# Patient Record
Sex: FEMALE | Race: WHITE | HISPANIC OR LATINO | URBAN - METROPOLITAN AREA
[De-identification: names, ages, dates, MRNs, and addresses within clinical notes are randomized per-mention and may not be internally consistent; named-entity substitution may affect disease eponyms.]

---

## 2023-03-03 ENCOUNTER — OFFICE VISIT (OUTPATIENT)
Dept: URGENT CARE | Facility: CLINIC | Age: 33
End: 2023-03-03

## 2023-03-03 VITALS
DIASTOLIC BLOOD PRESSURE: 62 MMHG | TEMPERATURE: 98.8 F | HEART RATE: 77 BPM | SYSTOLIC BLOOD PRESSURE: 122 MMHG | HEIGHT: 66 IN | OXYGEN SATURATION: 99 % | BODY MASS INDEX: 28.93 KG/M2 | RESPIRATION RATE: 18 BRPM | WEIGHT: 180 LBS

## 2023-03-03 DIAGNOSIS — R05.1 ACUTE COUGH: Primary | ICD-10-CM

## 2023-03-03 DIAGNOSIS — J06.9 UPPER RESPIRATORY TRACT INFECTION, UNSPECIFIED TYPE: ICD-10-CM

## 2023-03-03 LAB
SARS-COV-2 AG UPPER RESP QL IA: NEGATIVE
VALID CONTROL: NORMAL

## 2023-03-03 RX ORDER — PNV 119/IRON FUM/FOLIC ACID 29 MG-1 MG
1 TABLET ORAL DAILY
COMMUNITY

## 2023-03-04 NOTE — PROGRESS NOTES
[facility]        NAME: Regan Matson is a 27 y o  female  : 1990    MRN: [id number]  DATE: March 3, 2023  TIME: 7:14 PM    Assessment and Plan   Upper respiratory tract infection, unspecified type [J06 9]  1  Upper respiratory tract infection, unspecified type        2  Acute cough  Poct Covid 19 Rapid Antigen Test        POC Covid test negative, will send COVID/flu PCR and follow-up with results  Patient Instructions      COVID/flu testing done in office, will follow-up with results  Continue over-the-counter products for symptoms: tylenol for fevers, ibuprofen for body aches, flonase (fluticasone) for nasal congestion and airborne/emergen-c for vitamin supplementation  Follow-up with PCP in 3-5 days  Report to ER if symptoms worsen  Chief Complaint     Chief Complaint   Patient presents with   • Cold Like Symptoms     Pt experiencing fatigue, shortness of breath, congestion, sore throat and cough x5 days  History of Present Illness       27year old female presents with cough, congestion, and fever for the past 5 days  Patient reports possible exposure to sister who was on a cruise last week  Patient is 13 weeks pregnant and has been taking OTC products recommended by her OBGYN physician, with minimal improvement of symptoms  URI   This is a new problem  The current episode started in the past 7 days  The problem has been unchanged  The maximum temperature recorded prior to her arrival was 101 - 101 9 F  The fever has been present for less than 1 day  Associated symptoms include congestion, coughing, headaches, nausea, rhinorrhea and a sore throat  Pertinent negatives include no abdominal pain, chest pain, diarrhea, dysuria, ear pain, joint pain, joint swelling, neck pain, plugged ear sensation, rash, sinus pain, sneezing, swollen glands, vomiting or wheezing  She has tried acetaminophen and decongestant for the symptoms  The treatment provided mild relief  Review of Systems   Review of Systems   Constitutional: Positive for activity change, fatigue and fever  Negative for appetite change and chills  HENT: Positive for congestion, postnasal drip, rhinorrhea and sore throat  Negative for ear pain, sinus pressure, sinus pain, sneezing and trouble swallowing  Respiratory: Positive for cough  Negative for wheezing  Cardiovascular: Negative for chest pain and palpitations  Gastrointestinal: Positive for nausea  Negative for abdominal pain, diarrhea and vomiting  Genitourinary: Negative for dysuria  Musculoskeletal: Negative for arthralgias, joint pain, myalgias and neck pain  Skin: Negative for color change and rash  Neurological: Positive for headaches  Current Medications       Current Outpatient Medications:   •  Prenatal Vit-DSS-Fe Fum-FA (Prenatal 19) tablet, Take 1 tablet by mouth daily, Disp: , Rfl:     Current Allergies     Allergies as of 03/03/2023   • (No Known Allergies)            The following portions of the patient's history were reviewed and updated as appropriate: allergies, current medications, past family history, past medical history, past social history, past surgical history and problem list      History reviewed  No pertinent past medical history  History reviewed  No pertinent surgical history  History reviewed  No pertinent family history  Medications have been verified  Objective   /62 (BP Location: Left arm)   Pulse 77   Temp 98 8 °F (37 1 °C) (Temporal)   Resp 18   Ht 5' 6" (1 676 m)   Wt 81 6 kg (180 lb)   LMP 12/16/2022   SpO2 99%   BMI 29 05 kg/m²        Physical Exam     Physical Exam  Vitals and nursing note reviewed  Constitutional:       General: She is awake  Appearance: Normal appearance  She is well-developed and normal weight  HENT:      Head: Normocephalic and atraumatic        Right Ear: Hearing, tympanic membrane, ear canal and external ear normal       Left Ear: Hearing, tympanic membrane, ear canal and external ear normal       Nose: Congestion and rhinorrhea present  Rhinorrhea is clear  Right Turbinates: Enlarged  Not pale  Left Turbinates: Enlarged  Not swollen or pale  Right Sinus: No maxillary sinus tenderness or frontal sinus tenderness  Left Sinus: No maxillary sinus tenderness or frontal sinus tenderness  Mouth/Throat:      Mouth: Mucous membranes are moist       Pharynx: Oropharynx is clear  Uvula midline  Posterior oropharyngeal erythema present  No oropharyngeal exudate  Cardiovascular:      Rate and Rhythm: Normal rate and regular rhythm  Pulses: Normal pulses  Heart sounds: Normal heart sounds  Pulmonary:      Effort: Pulmonary effort is normal       Breath sounds: Normal breath sounds  Musculoskeletal:      Cervical back: Normal range of motion and neck supple  Lymphadenopathy:      Cervical: Cervical adenopathy present  Skin:     General: Skin is warm and dry  Neurological:      Mental Status: She is alert and oriented to person, place, and time  Psychiatric:         Mood and Affect: Mood normal          Behavior: Behavior normal  Behavior is cooperative  Thought Content:  Thought content normal          Judgment: Judgment normal

## 2023-03-04 NOTE — PATIENT INSTRUCTIONS
COVID/flu testing done in office, will follow-up with results  Continue over-the-counter products for symptoms: tylenol for fevers, ibuprofen for body aches, flonase (fluticasone) for nasal congestion and airborne/emergen-c for vitamin supplementation  Follow-up with PCP in 3-5 days  Report to ER if symptoms worsen

## 2023-03-05 ENCOUNTER — TELEPHONE (OUTPATIENT)
Dept: URGENT CARE | Facility: MEDICAL CENTER | Age: 33
End: 2023-03-05

## 2023-03-05 LAB
FLUAV RNA RESP QL NAA+PROBE: NEGATIVE
FLUBV RNA RESP QL NAA+PROBE: POSITIVE
SARS-COV-2 RNA RESP QL NAA+PROBE: NEGATIVE

## 2023-03-06 ENCOUNTER — TELEPHONE (OUTPATIENT)
Dept: OBGYN CLINIC | Facility: CLINIC | Age: 33
End: 2023-03-06

## 2023-03-06 ENCOUNTER — TELEPHONE (OUTPATIENT)
Dept: URGENT CARE | Facility: CLINIC | Age: 33
End: 2023-03-06

## 2023-03-06 NOTE — TELEPHONE ENCOUNTER
Patient and  called back, and COVID/flu results were discussed with them  Recommend they follow up with OB

## 2023-03-06 NOTE — TELEPHONE ENCOUNTER
Left voice message to patient- never been seen cannot give medical advice  Reach out to PCP- old OBGYN- or if urgent goto ER

## 2023-03-06 NOTE — TELEPHONE ENCOUNTER
Pt would be a NEW pt (12 wks) being seen here 3/23 for establishing Care/ pt did have an [address] at [facility] which is being faxed over,   pt seen Friday 3/3 at [facility] for flu symptoms which Results state pt has FLU she  has had a rapid heart beat on & off since last week and  Drew Pepper would like a call to discuss,  pls call at [phone],  thanks

## 2023-03-23 ENCOUNTER — OFFICE VISIT (OUTPATIENT)
Dept: OBGYN CLINIC | Facility: MEDICAL CENTER | Age: 33
End: 2023-03-23

## 2023-03-23 VITALS
BODY MASS INDEX: 38.25 KG/M2 | HEIGHT: 61 IN | DIASTOLIC BLOOD PRESSURE: 70 MMHG | WEIGHT: 202.6 LBS | SYSTOLIC BLOOD PRESSURE: 118 MMHG | HEART RATE: 66 BPM

## 2023-03-23 DIAGNOSIS — Z3A.13 13 WEEKS GESTATION OF PREGNANCY: Primary | ICD-10-CM

## 2023-03-23 DIAGNOSIS — O99.211 OBESITY AFFECTING PREGNANCY IN FIRST TRIMESTER: ICD-10-CM

## 2023-03-23 DIAGNOSIS — O26.891 HEARTBURN DURING PREGNANCY IN FIRST TRIMESTER: ICD-10-CM

## 2023-03-23 DIAGNOSIS — R12 HEARTBURN DURING PREGNANCY IN FIRST TRIMESTER: ICD-10-CM

## 2023-03-23 DIAGNOSIS — R35.0 URINARY FREQUENCY: ICD-10-CM

## 2023-03-23 PROBLEM — O99.210 OBESITY AFFECTING PREGNANCY: Status: ACTIVE | Noted: 2023-03-23

## 2023-03-23 LAB
BACTERIA UR QL AUTO: ABNORMAL /HPF
BILIRUB UR QL STRIP: NEGATIVE
CLARITY UR: CLEAR
COLOR UR: COLORLESS
GLUCOSE UR STRIP-MCNC: ABNORMAL MG/DL
HGB UR QL STRIP.AUTO: NEGATIVE
KETONES UR STRIP-MCNC: NEGATIVE MG/DL
LEUKOCYTE ESTERASE UR QL STRIP: NEGATIVE
NITRITE UR QL STRIP: NEGATIVE
NON-SQ EPI CELLS URNS QL MICRO: ABNORMAL /HPF
PH UR STRIP.AUTO: 6 [PH]
PROT UR STRIP-MCNC: NEGATIVE MG/DL
RBC #/AREA URNS AUTO: ABNORMAL /HPF
SP GR UR STRIP.AUTO: 1.01 (ref 1–1.03)
UROBILINOGEN UR STRIP-ACNC: <2 MG/DL
WBC #/AREA URNS AUTO: ABNORMAL /HPF

## 2023-03-23 NOTE — PATIENT INSTRUCTIONS
Again, congratulations on your pregnancy! NEXT STEPS  Get Prenatal bloodwork  Call MFM to schedule next ultrasound (done 12-13 wks)   Contact information for [facility] (AKA Maternal Fetal Medicine)- Main Number [phone]   Return to our office in 1-2 weeks for an OB intake and initial prenatal Exam(or sooner if any problems/concerns arise- see packet for things to report)  Check out Melony [facility]illo[employer] website to read the "Pregnancy Essentials Guide" -this has lots of great early pregnancy information     It can be found by going to [employer]  org-->select services-->select women's health-->select Obstetrics  [email or web address]    Below is a variety of information that is useful in early pregnancy   Genetic screening can be very confusing for most people   We do recommend genetic screening universally for all pregnant women  Our goal is to have the most healthy uncomplicated pregnancy possible and this is one way to identify possible complications early  Common disorders we can screen for include: Down Syndrome, Neural tube defects ( like spina bifida or gastroschisis) and trisomy 15, even possibly more  There are several options we will talk more about  Below is some information to get you started thinking about this  We will discuss this more at the next appt  GUIDE TO GENETIC TESTING    Aneuploidy Testing  Trisomy 21 (Down Syndrome), Trisomy 18, and Open Neural Tube Defects (Spina Bifida)  You may choose one of the following options: See below for CPT/Diagnostic codes  NIPT (Non-Invasive Prenatal Testing or Cell Free- Fetal DNA): This simple and accurate non-invasive prenatal screening blood test offers results for early risk assessment of Down syndrome (Trisomy 21), or Trisomy 25, trisomy 15 and other aneuploidy conditions  The test also offers an optional analysis for fetal sex    The test analyzes the relative amount of 21, 18, 13; X and Y chromosome material in circulating cell-free DNA from a maternal blood sample  This test can be performed at any time after 10 weeks gestation  If you elect this test, you will also have an AFP (alpha-fetoprotein) blood test to test for open neural tube defects  Recommended follow up to a positive result is genetic counseling and prenatal diagnosis  Sequential Screening with Nuchal Translucency: This is a two-step test to detect whether a fetus is at increased risk for trisomy 24, trisomy 25, trisomy 15 and open neural tube defects  The test has a narrow window for testing (the first step must be performed between 10 and 13 weeks gestation)  It includes two blood draws and an ultrasound  The ultrasound measures the amount of fluid behind the baby’s neck called the nuchal translucency (NT)  The blood tests measures three different maternal hormone levels, -- pregnancy associated plasma protein (JONO-A), human chorionic gonadotrophin (hCG), and dimeric inhibin A (ROSEMARY)  These measurements in combination with some maternal information such as height and weight are used to calculate whether the baby is at increased risk for Down Syndrome or Trisomy 18  An alpha-fetoprotein test (AFP) is also included to test for open neural tube defects  Recommended follow up to a positive result is additional testing that is more definitive, and referral for genetic counseling and prenatal diagnosis  Quad Screen: This test is also known as the quadruple marker test   It is a test that measures levels of four substances in a pregnant woman’s blood--Alpha-fetoprotein (AFP), a protein made by the developing baby; Human chorionic gonadotropin (hCG), a hormone made by the placenta; Estriol, a hormone made by the placenta and the baby’s liver; and Inhibin A, another hormone made by the placenta    Typically, the quad screen is done between weeks 15 and 20 of pregnancy--the second trimester and the results indicate whether the baby is at higher risk for Down Syndrome (Trisomy 21), Trisomy 18, and open neural tube defects  This is a screening test   Recommended follow up to a positive result is additional testing that is more definitive, as well as referral for genetic counseling and prenatal diagnosis  Trisomy 21 Trisomy 18 Trisomy 13   NIPT  (FPR 0 1%) <99% <98% 99%   Sequential Screening (FPR 3 5%) 92% 90% N/A   Quad Screen   (FPR 5%) 83% 80% N/A   (FPR is False Positive Rate)       Additional Screening Tests Offered  Cystic Fibrosis: Cystic Fibrosis is the most common inherited disease of children and young adults  The carrier frequency is 1 in 24, to 1 in 97  Both parents need to be carriers for a child to be affected (25% chance)  One in 200, children born are affected  Cystic fibrosis is a disorder of mucus production and produces abnormally thick mucus leading to life threatening lung infections, digestion problems, poor growth, infertility, and more  Symptoms range from mild to severe, but individuals with severe disease may die in childhood  With treatments today, people with Cystic Fibrosis can live into their 30’s  CF does not affect intelligence  Recommended follow up to a positive result is testing of the baby’s father  Spinal Muscular Atrophy (SMA): SMA is the most common inherited cause of early childhood death  The carrier frequency is 1 in 52 to 1 in 67 in the [place] and both parents need to be carriers for a child to be affected (25% chance)  1 in 11,000 children are affected  SMA is a progressive degeneration of lower motor neurons  Muscle weakness is the most common type with respiratory failure by the age of 3years old  Muscles responsible for crawling, walking, swallowing, and head and neck control are most severely affected  Recommended follow up to a positive result is testing of the baby’s father        Fragile X Syndrome (the most common inherited cause of developmental delays): Fragile X syndrome is an “X-linked” genetic disease, which means it is only carried by the mom  Unfortunately, 1 in 250 females are carriers and a child has a 50% chance of being affected if this is the case  1 in 4000 boys is affected with Fragile X and 1 in 8000 girls is affected  Approximately 1/3 of all children born with Fragile X also have autism and hyperactivity  Recommended follow up to a positive result is genetic counseling and prenatal diagnosis  Guide To Insurance Coverage For Genetic Screening  Due to the complexity of coverage guidelines, we are unable to quote benefits or guarantee insurance coverage for any of these tests  Insurance benefits are plan-specific and offer vastly different coverage based on your policy  The handout attached explains the benefits to each test, and also provides the billing (CPT) codes for each test   Even if the testing is covered, it could be applied to any unmet deductibles, and copays may apply, resulting in a bill  You are encouraged to contact your insurance company to obtain your benefits based on your age and risk factors, so that there are no surprises  Test Insurance Procedure (CPT) code   NIPT-cell free fetal DNA Most accurate non-invasive test- not always covered w/o risk factors 25223   Sequential Screen w/ NT US Current standard test-should be covered Part 1: (if lab is [facility]) 83756,12229   (If lab is [facility])   Part 2: (if lab is [facility])59480,92838,57485, 28822  (If lab is [facility]) 54843   Quad screen Old standard-use if past 1st trimester 43583, [facility], [address], [address]   CF- Cystic Fibrosis  39602   SMA- Spinal Musc   Atrophy  06158   Fragile X  [id number]       Diagnosis code used Varies based on history- But will be one of these:  Encounter for  screening for other genetic defect Z36 8  Advanced Maternal Age (over 28) O12 46  Family History of Genetic Disease Carrier Z84 81    Please contact your insurance company with the appropriate CPT code from the attached list, and diagnosis code applicable to your situation  We ask that you review this information and decide what testing you would like to have performed  Please note that the Sequential Screening with Nuchal Translucency has a smaller window of time to be performed during pregnancy (Prior to 14 wks)  Warning Signs During Pregnancy  The list below includes warning signs your providers would like you to be aware of  If you experience any of these at any time during your pregnancy, please call us as soon as possible  Vaginal bleeding   Sharp abdominal pain that does not go away   Fever (more than 100  4? F and is not relieved with Tylenol)   Persistent vomiting lasting greater than 24 hours   Chest pain   Pain or burning when you urinate   Severe headache that doesn’t resolve with Tylenol   Blurred vision or seeing spots in your vision   Sudden swelling of your face or hands   Redness, swelling or pain in a leg   A sudden weight gain in just a few days   Decrease in your baby’s movements (after 28 weeks or the 6th month of pregnancy)   A loss of watery fluid from your vagina - can be a gush, a trickle or  continuous wetness   After 20 weeks of pregnancy, rhythmic cramping (greater than 4 per hour)  or menstrual like low/pelvic pain    Call the OFFICE [phone] for any questions/emergencies  At night or on the weekend, please indicate it is an emergency and the DOCTOR on call will be paged      Discomforts of Early Pregnancy    Tips for coping with nausea and vomiting during pregnancy   Eat meals and snacks slowly   Eat every 1-2 hours to avoid a full stomach   Don’t skip meals, avoid empty stomach   Eat a snack prior to getting out of bed   Avoid food and beverages with a strong aroma   Avoid dehydration - drink enough fluid to keep the urine pale yellow   Drink fluids before a meal to minimize the effect of a full stomach   Limit the amount of coffee and beverages that contain caffeine Eliminate spicy, odorous, high fat (fried foods), acidic (tomato products) and sweet foods   Fluids that contain lemon (lemonade), mint (tea) or orange can usually be well tolerated   Snacks and meals that contain low-fat protein (lean meats, fish, poultry and eggs) along with eating easily digestible carbs (fruit, rice, toast, crackers and dry cereal) may be tolerated better   Foods with ginger may be well tolerated  May use ginger root powder, capsules or extract (up to 1000 mg per day)   Drink liquids in small amounts    If symptoms persist, please contact your provider  Tips for coping with constipation during pregnancy   Increase fiber and fluids   - Drink 8-10 cups of liquid, like water or low-sugar juice daily  - Keep urine pale with fluids (water, milk), fruit and vegetables   Eat a well-balanced diet that contains high fiber food (fruits, vegetables, whole grain breads and cereals, bran and dried beans)   Take a 30-minute walk daily   You may take a mild stool softener such as Colace®    If symptoms persist, please contact your provider  For any emergencies, PLEASE CALL THE OFFICE at [phone]  If the office is closed, the doctor on call will be paged by the answering service  Medications and Pregnancy- see Pregnancy Essentials guide online- page 9    Expected Weight Gain During Pregnancy  If you have a healthy BMI (18-25) prior to pregnancy:  The recommended weight gain is between 25-35 pounds  Approximate weight gain  in the first trimester is 1-4 5 pounds  An expected weight gain during the second and  third trimester is approximately one pound per week  If you have a BMI of less than 18 prior to pregnancy,  you are considered underweight:  The recommended weight gain is between 28-40 pounds  Approximate weight gain  in the first trimester is 1-4 5 pounds  An expected weight gain during the second and  third trimester is just over one pound per week    If your BMI is 25 to 29 9: you are considered overweight:  You should gain 1/2 to 2/3 pound during the second and third trimester, for a total  weight gain of 15 to 25 pounds  If you have a BMI of greater than 30 prior to pregnancy,  you are considered overweight:  The recommended weight gain is between 15-25 pounds  Approximate weight gain  in the first trimester is 1-4 5 pounds  An expected weight gain during the second  and third trimester is approximately 0 5 pound per week  Foods to avoid during pregnancy:   Unpasteurized milk, juice and cheese  - Soft cheeses like feta or brie (if made with UNPASTEURIZED milk)   Unheated deli meats like lunchmeat and hotdogs   Undercooked poultry, beef, pork, seafood including raw sushi    What fish is safe to eat during pregnancy? Eat 8 to 12 ounces of fish a week  Pick from this group frequently, especially if you follow  the American Heart Association’s recommendation to eat fish at least 2 times a week  AVOID HIGH MERCURY FISH  A single meal of the following fish can put  you over the Environmental Protection  Agency’s safe limit for the month  High mercury fish:  Shark  Swordfish  [employer]  Tile Fish    Caffeine and Pregnancy    The  and [facility] of Obstetrics and Gynecologists (ACOG) urge pregnant  women to limit their caffeine consumption to no more than 200 milligrams (mg) per day  This is  comparable to having one 12-ounce cup of coffee a day  This level has been shown not to increase risk  of miscarriage, growth or  labor complications  Effects of higher levels are not known  Exercise During Pregnancy  A daily exercise program that consists of 30 minutes a day is recommended  Low impact exercises like walking and swimming are great exercises throughout  all of pregnancy   If you’re an avid strength  avoid lifting very heavy weights - nothing more  than 30 pounds    Drink plenty of fluids while exercising to stay hydrated    Be careful to avoid overheating  ACTIVITIES TO AVOID   Exercises that can make you lose your balance  Activities that can put your baby at risk i e  horseback riding, scuba diving, skiing  or snowboarding  Any other sport that puts you at risk for getting hit in the  abdominal area  Do not use saunas, steam rooms or hot tubs (that have a higher temperature  than 100F)   After the first trimester, avoid exercises that require you to lay flat on your back  Avoid exceeding a heart rate greater than 140 beats per minute   As long as you are  able to hold a conversation while exercising your heart rate is likely acceptable

## 2023-03-23 NOTE — PROGRESS NOTES
Assessment/Plan:       1  13 weeks gestation of pregnancy  Assessment & Plan:  OB transfer of care- here to establish care  13w6d  Did get some care at previous OB in [address] [place] [address]  Records were requested but not yet received  No OB c/o today other than heartburn  See other A&P  She would like genetic screening including fetal sex- referral to [facility] for this and [facility]  Is too far for NT US  Awaiting PN records to determine gaps in BW orders  Clean catch urine collected for UA C&S due to c/o frequency but likely nml for pregnancy as she denies any Dysuria  RTO in 2 wks for OB intake followed by PNcheck    Orders:  -     Ambulatory Referral to Maternal Fetal Medicine; Future; Expected date: 03/23/2023    2  Heartburn during pregnancy in first trimester  Assessment & Plan:  C/O heartburn  Tums ineffective  Advised to try pepcid OTC BID  Call if ineffective      3  Urinary frequency  Comments:  will check UA C&S  likely 2/2 to nml pregnancy  Orders:  -     Urinalysis with microscopic  -     Urine culture    4  Obesity affecting pregnancy in first trimester  Assessment & Plan:  BMI 38  Will need to do early glucola if not done by previous OBGYN  Subjective:      Patient ID: Ricardo Daniel is a 27 y o  female  She is here for Routine Prenatal Visit (Patient is here for routine prenatal to establish care/Lmp-12/16/2022/YARA-09/22/2023/She denies any vaginal bleeding,lof, or cramping /No nausea or vomiting   /Surprise pregnancy , first pregnancy/Patient would like to talk Genetics testing for baby /No other questions or concerns at this time  )    HPI   Pt is porteguese speaking  Spouse is translating  OB transfer  Previous care with [facility] in [address] [place] [address]  Their fax machine broke and were not able to send records today  LMP 12/16/23 edc 9/22/23 - GA13 w6d   Reports she did have a dating US and no change in [facility]  Pregnancy so far uncomplicated per pt     Denies any medical problems  Pt did some routine PNBW, but not sure all it entailed  She denies vaginal bleeding or cramping  NO abnormal d/c  Some urinary frequency  No N/V anymore but is having some reflux and heartburn  Tums are not effective  Has not tried anything else  Reports her last pap was 9/2021    Menstrual History:  Patient's last menstrual period was 12/16/2022 (approximate)  The following portions of the patient's history were reviewed and updated as appropriate: allergies, current medications, past family history, past medical history, past social history, past surgical history, and problem list     Review of Systems  See HPI for pertinent positives          Objective:    /70 (BP Location: Left arm, Patient Position: Sitting, Cuff Size: Large)   Pulse 66   Ht 5' 0 5" (1 537 m)   Wt 91 9 kg (202 lb 9 6 oz)   LMP 12/16/2022 (Approximate)   BMI 38 92 kg/m²      Physical Exam  Constitutional:       General: She is not in acute distress  Appearance: Normal appearance  Genitourinary:      Genitourinary Comments: Pelvic exam deferred     Cardiovascular:      Rate and Rhythm: Normal rate  Pulmonary:      Effort: Pulmonary effort is normal    Abdominal:      General: There is no distension  Palpations: Abdomen is soft  Musculoskeletal:         General: Normal range of motion  Neurological:      Mental Status: She is alert and oriented to person, place, and time  Skin:     General: Skin is warm and dry     Psychiatric:         Mood and Affect: Mood normal          Behavior: Behavior normal

## 2023-03-23 NOTE — ASSESSMENT & PLAN NOTE
OB transfer of care- here to establish care  13w6d  Did get some care at previous OB in [place]  Records were requested but not yet received  No OB c/o today other than heartburn  See other A&P  She would like genetic screening including fetal sex- referral to [facility] for this and [facility]  Is too far for NT US  Awaiting PN records to determine gaps in BW orders  Clean catch urine collected for UA C&S due to c/o frequency but likely nml for pregnancy as she denies any Dysuria     RTO in 2 wks for OB intake followed by Clement

## 2023-03-24 ENCOUNTER — INITIAL PRENATAL (OUTPATIENT)
Dept: OBGYN CLINIC | Facility: CLINIC | Age: 33
End: 2023-03-24

## 2023-03-24 VITALS — HEIGHT: 67 IN | BODY MASS INDEX: 31.71 KG/M2 | WEIGHT: 202 LBS

## 2023-03-24 DIAGNOSIS — Z34.82 PRENATAL CARE, SUBSEQUENT PREGNANCY, SECOND TRIMESTER: Primary | ICD-10-CM

## 2023-03-24 DIAGNOSIS — Z34.02 ENCOUNTER FOR SUPERVISION OF NORMAL FIRST PREGNANCY IN SECOND TRIMESTER: ICD-10-CM

## 2023-03-24 DIAGNOSIS — O99.211 OBESITY AFFECTING PREGNANCY IN FIRST TRIMESTER: ICD-10-CM

## 2023-03-24 LAB — BACTERIA UR CULT: ABNORMAL

## 2023-03-24 NOTE — PROGRESS NOTES
OB INTAKE INTERVIEW  Patient is 27 y o y o  who presents for OB intake at 14wks  She is accompanied by FOB during this encounter  The father of her baby Diandra Sagastume) is involved in the pregnancy and is 35years old  * he assisted in translating some things      Last Menstrual Period: 2022  Ultrasound: Measured 7 weeks 1 days on 2023 by [facility]  Estimated Date of Delivery: 3/27/2023 confirmed by 7 week US    Signs/Symptoms of Pregnancy  Current pregnancy symptoms: fatigue, UTi sx  Constipation no  Headaches no  Cramping/spotting no  PICA cravings no    Diabetes-  Body mass index is 31 64 kg/m²  If patient has 1 or more, please order early 1 hour GTT  History of GDM no  BMI >35 no  History of PCOS or current metformin use no  History of LGA/macrosomic infant (4000g/9lbs) no    If patient has 2 or more, please order early 1 hour GTT  BMI>30 YES  AMA no  First degree relative with type 2 diabetes no  History of chronic HTN, hyperlipidemia, elevated A1C no  High risk race (, , ,  or ) YES    Hypertension- if you answer yes, please order preeclampsia labs (cbc, comprehensive metabolic panel, urine protein creatinine ratio, uric acid)  History of of chronic HTN no  History of gestational HTN no  History of preeclampsia, eclampsia, or HELLP syndrome no  History of diabetes no  History of lupus, autoimmune disease, kidney disease no    Thyroid- if yes order TSH with reflex T4  History of thyroid disease no    Bleeding Disorder or Hx of DVT-patient or first degree relative with history of  Order the following if not done previously     (Factor V, antithrombin III, prothrombin gene mutation, protein C and S Ag, lupus anticoagulant, anticardiolipin, beta-2 glycoprotein)   no    OB/GYN-  History of abnormal pap smear YES - she said nothing needed to be done    Date of last pap smear 2021  History of HPV no  History of Herpes/HSV no  History of other STI (gonorrhea, chlamydia, trich) no  History of prior  no  History of prior  no  History of  delivery prior to 36 weeks 6 days no  History of blood transfusion no  Ok for blood transfusion YES    Substance screening- if yes outside of tobacco for her or anyone in her home-order urine drug screen  History of tobacco use no  Currently using tobacco no  Currently using alcohol no  Presently using drugs no  Past drug use  no  IV drug use- no  Partner drug use no  Parent/Family drug use no    MRSA Screening-   Does the pt have a hx of MRSA? no  If yes- please follow MRSA protocol and obtain a nasal swab for MRSA culture    Immunizations:  Influenza vaccine given this season NO  Discussed Tdap vaccine YES  Discussed COVID Vaccine YES    Genetic/MFM-  Do you or your partner have a history of any of the following in yourselves or first degree relatives? Cystic fibrosis no  Spinal muscular atrophy no  Hemoglobinopathy/Sickle Cell/Thalassemia no  Fragile X Intellectual Disability no    If yes, discuss carrier screening and recommend consultation with [facility]/genetic counseling  If no, discuss option for carrier screening and/or genetic testing with [facility]  Patient interested YES  Appointment at [facility] made no- didn't make appts yet- advised to call ASAP      Interview education  [facility] Pregnancy Essentials Book reviewed, discussed and attached to their AVS YES    Nurse/Family Partnership- patient may qualify YES; referral placed NO    Prenatal lab work scripts YES  Extra labs ordered:  1 hr- no urine because that was put in yesterday    Aspirin/Preeclampsia Screen    Risk Level Risk Factor Recommendation   LOW Prior Uncomplicated full-term delivery no No Aspirin recommendation        MODERATE Nulliparity YES Recommend low-dose aspirin if     BMI>30 YES 2 or more moderate risk factors    Family History Preeclampsia (mother/sister) no     35yr old or greater no      or Low Socioeconomic no     IVF Pregnancy  no     Personal History Risks (low birth weight, prior adverse preg outcome, >10yr preg interval) no         HIGH History of Preeclampsia no Recommend low-dose aspirin if     Multifetal gestation no 1 or more high risk factors    Chronic HTN no     Type 1 or 2 Diabetes no     Renal Disease no     Autoimmune Disease  no      Contraindications to ASA therapy:  NSAID/ ASA allergy: no  Nasal polyps: no  Asthma with history of ASA induced bronchospasm: no  Relative contraindications:  History of GI bleed: no  Active peptic ulcer disease: no  Severe hepatic dysfunction: no    Patient should be recommended to take ASA 162mg during this pregnancy from 12-36wks to lower her risk of preeclampsia: YES      The patient has a history now or in prior pregnancy notable for:  AMA,       Details that I feel the provider should be aware of: Jakob Vang and Victorino Whalen are expecting their 1st baby! New with [facility]  She is doing ok , has some fatigue issues  We discussed things to help with that  We had a discussion about her plans for delivery  She said [place] she is from [place] it is custom to have csections- she is nervous, as an relative who also pregnant was told that is not an option  Advised can discuss at later PN visit with MD's  Her urine culture is till in process but urine so far looks ok  I did not see pap info from [address] nor PN labs so ordered the rest including a 1hr  Also, advised to call [facility] and confirmed they do use [facility]  [facility] info from 2/7 also had her WT as 180lb- we got her at 202  Sig increase  PN1 visit scheduled  The patient was oriented to our practice, reviewed delivering physicians and [employer] for Delivery  All questions were answered      Interviewed by: Leslie Ramos MA

## 2023-03-24 NOTE — PATIENT INSTRUCTIONS
Congratulations!! Please review our Pregnancy Essential Guide and [facility] L&D Virtual tour from our [employer]  [facility] Guide  [facility] ([address])     [address] ([place])

## 2023-03-27 ENCOUNTER — INITIAL PRENATAL (OUTPATIENT)
Dept: OBGYN CLINIC | Facility: CLINIC | Age: 33
End: 2023-03-27

## 2023-03-27 VITALS
SYSTOLIC BLOOD PRESSURE: 116 MMHG | DIASTOLIC BLOOD PRESSURE: 78 MMHG | BODY MASS INDEX: 31.79 KG/M2 | HEART RATE: 78 BPM | WEIGHT: 203 LBS

## 2023-03-27 DIAGNOSIS — Z11.3 SCREENING FOR STDS (SEXUALLY TRANSMITTED DISEASES): ICD-10-CM

## 2023-03-27 DIAGNOSIS — Z34.82 ENCOUNTER FOR SUPERVISION OF OTHER NORMAL PREGNANCY, SECOND TRIMESTER: Primary | ICD-10-CM

## 2023-03-27 DIAGNOSIS — O99.212 OBESITY AFFECTING PREGNANCY IN SECOND TRIMESTER: ICD-10-CM

## 2023-03-27 DIAGNOSIS — Z3A.14 14 WEEKS GESTATION OF PREGNANCY: ICD-10-CM

## 2023-03-27 LAB
SL AMB  POCT GLUCOSE, UA: NORMAL
SL AMB POCT URINE PROTEIN: NORMAL

## 2023-03-27 NOTE — PATIENT INSTRUCTIONS
Pregnancy at [address] to [address]:   You are now at the end of your first trimester and entering your second trimester  Morning sickness usually goes away by this time  You may have other symptoms such as fatigue, frequent urination, and headaches  You may have gained 2 to 4 pounds by now  DISCHARGE INSTRUCTIONS:   Return to the emergency department if:   You have pain or cramping in your abdomen or low back  You have heavy vaginal bleeding or clotting  You pass material that looks like tissue or large clots  Collect the material and bring it with you  Call your doctor or obstetrician if:   You cannot keep food or drinks down, and you are losing weight  You have light bleeding  You have chills or a fever  You have vaginal itching, burning, or pain  You have yellow, green, white, or foul-smelling vaginal discharge  You have pain or burning when you urinate, less urine than usual, or pink or bloody urine  You have questions or concerns about your condition or care  How to care for yourself at this stage of your pregnancy:       Get plenty of rest   You may feel more tired than normal  You may need to take naps or go to bed earlier  Manage nausea and vomiting  Avoid fatty and spicy foods  Eat small meals throughout the day instead of large meals  Comfort may help to decrease nausea  Ask your healthcare provider about other ways of decreasing nausea and vomiting  Eat a variety of healthy foods  Healthy foods include fruits, vegetables, whole-grain breads, low-fat dairy foods, beans, lean meats, and fish  Drink liquids as directed  Ask how much liquid to drink each day and which liquids are best for you  Limit caffeine to less than 200 milligrams each day  Limit your intake of fish to 2 servings each week  Choose fish low in mercury such as canned light tuna, shrimp, salmon, cod, or tilapia   Do not  eat fish high in mercury such as swordfish, tilefish, senia mackerel, and shark  Take prenatal vitamins as directed  Your need for certain vitamins and minerals, such as folic acid, increases during pregnancy  Prenatal vitamins provide some of the extra vitamins and minerals you need  Prenatal vitamins may also help to decrease the risk of certain birth defects  Do not smoke  Smoking increases your risk of a miscarriage and other health problems during your pregnancy  Smoking can cause your baby to be born too early or weigh less at birth  Ask your healthcare provider for information if you need help quitting  Do not drink alcohol  Alcohol passes from your body to your baby through the placenta  It can affect your baby's brain development and cause fetal alcohol syndrome (FAS)  FAS is a group of conditions that causes mental, behavior, and growth problems  Talk to your healthcare provider before you take any medicines  Many medicines may harm your baby if you take them when you are pregnant  Do not take any medicines, vitamins, herbs, or supplements without first talking to your healthcare provider  Never use illegal or street drugs (such as marijuana or cocaine) while you are pregnant  Safety tips during pregnancy:   Avoid hot tubs and saunas  Do not use a hot tub or sauna while you are pregnant, especially during your first trimester  Hot tubs and saunas may raise your baby's temperature and increase the risk of birth defects  Avoid toxoplasmosis  This is an infection caused by eating raw meat or being around infected cat feces  It can cause birth defects, miscarriages, and other problems  Wash your hands after you touch raw meat  Make sure any meat is well-cooked before you eat it  Avoid raw eggs and unpasteurized milk  Use gloves or ask someone else to clean your cat's litter box while you are pregnant  Changes happening with your baby: Your baby has fully formed fingernails and toenails  Your baby's heartbeat can now be heard   Ask your healthcare provider if you can listen to your baby's heartbeat  By week 14, your baby is over 4 inches long from the top of the head to the rump (baby's bottom)  Your baby weighs over 3 ounces  Prenatal care:  Prenatal care is a series of visits with your healthcare provider throughout your pregnancy  During the first 28 weeks of your pregnancy, you will see your healthcare provider 1 time each month  Prenatal care can help prevent problems during pregnancy and childbirth  Your healthcare provider will check your blood pressure and weight  Your baby's heart rate will also be checked  You may also need the following at some visits:  A pelvic exam  allows your healthcare provider to see your cervix (the bottom part of your uterus)  Your healthcare provider will use a speculum to open your vagina  He or she will check the size and shape of your uterus  Blood tests  may be done to check for any of the following:    Gestational diabetes or anemia (low iron level)    Blood type or Rh factor, or certain birth defects    Immunity to certain diseases, such as chickenpox or rubella    An infection, such as a sexually transmitted infection, HIV, or hepatitis B    Hepatitis B  may need to be prevented or treated  Hepatitis B is inflammation of the liver caused by the hepatitis B virus (HBV)  HBV can spread from a mother to her baby during delivery  You will be checked for HBV as early as possible in the first trimester of each pregnancy  You need the test even if you received the hepatitis B vaccine or were tested before  You may need to have an HBV infection treated before you give birth  Urine tests  may also be done to check for sugar and protein  These can be signs of gestational diabetes or preeclampsia  Urine tests may also be done to check for signs of infection  A fetal ultrasound  shows pictures of your baby inside your uterus   The pictures are used to check your baby's development, movement, and position  Genetic disorder screening tests  may be offered to you  These tests check your baby's risk for genetic disorders such as Down syndrome  A screening test includes a blood test and ultrasound  Follow up with your doctor or obstetrician as directed:  Go to all prenatal visits  Write down your questions so you remember to ask them during your visits  © Copyright [employer] 2022 Information is for End User's use only and may not be sold, redistributed or otherwise used for commercial purposes  The above information is an  only  It is not intended as medical advice for individual conditions or treatments  Talk to your doctor, nurse or pharmacist before following any medical regimen to see if it is safe and effective for you

## 2023-03-27 NOTE — PROGRESS NOTES
Problem   14 Weeks Gestation of Pregnancy    Blood Type:  Antibody   Pap done  GC/CT -  done  PN1 Labs-  Hgb:   28 Week Labs-  COVID vaccine-     Blue folder- reviewed  Yellow folder-    Genetic screening-   AFP- given and to be done in 4 days  Level 1- appt 3/30  Level 2-    Yellow folder-  TDAP -   Rhogam? -   Delivery consent-  Breast pump -   Pediatrician -    Perineal massage -  GBS swab -   IOL -             Obesity Affecting Pregnancy in Second Trimester     14 weeks gestation of pregnancy  Here for 1st OB appointment accompanied by the FOB  Feels well  Pap/GC/CT done today  Denies LOF/CTX/VB  No pregnancy concerns expressed by patient  All questions answered  Oriented to our practice  Blue folder given and reviewed

## 2023-03-27 NOTE — ASSESSMENT & PLAN NOTE
Here for 1st OB appointment accompanied by the FOB  Feels well  Pap/GC/CT done today  Denies LOF/CTX/VB  No pregnancy concerns expressed by patient  All questions answered  Oriented to our practice  Blue folder given and reviewed

## 2023-03-29 LAB
HPV HR 12 DNA CVX QL NAA+PROBE: NEGATIVE
HPV16 DNA CVX QL NAA+PROBE: NEGATIVE
HPV18 DNA CVX QL NAA+PROBE: NEGATIVE

## 2023-03-30 ENCOUNTER — TELEMEDICINE (OUTPATIENT)
Dept: PERINATAL CARE | Facility: CLINIC | Age: 33
End: 2023-03-30

## 2023-03-30 DIAGNOSIS — Z3A.13 13 WEEKS GESTATION OF PREGNANCY: ICD-10-CM

## 2023-03-30 LAB
C TRACH DNA SPEC QL NAA+PROBE: NEGATIVE
N GONORRHOEA DNA SPEC QL NAA+PROBE: NEGATIVE

## 2023-03-31 ENCOUNTER — APPOINTMENT (OUTPATIENT)
Dept: LAB | Facility: HOSPITAL | Age: 33
End: 2023-03-31

## 2023-03-31 DIAGNOSIS — Z34.82 ENCOUNTER FOR SUPERVISION OF OTHER NORMAL PREGNANCY, SECOND TRIMESTER: ICD-10-CM

## 2023-03-31 DIAGNOSIS — Z34.82 PRENATAL CARE, SUBSEQUENT PREGNANCY, SECOND TRIMESTER: ICD-10-CM

## 2023-03-31 LAB
ABO GROUP BLD: NORMAL
BASOPHILS # BLD AUTO: 0.03 THOUSANDS/ÂΜL (ref 0–0.1)
BASOPHILS NFR BLD AUTO: 0 % (ref 0–1)
BLD GP AB SCN SERPL QL: NEGATIVE
EOSINOPHIL # BLD AUTO: 0.12 THOUSAND/ÂΜL (ref 0–0.61)
EOSINOPHIL NFR BLD AUTO: 1 % (ref 0–6)
ERYTHROCYTE [DISTWIDTH] IN BLOOD BY AUTOMATED COUNT: 12.6 % (ref 11.6–15.1)
GLUCOSE 1H P 50 G GLC PO SERPL-MCNC: 71 MG/DL (ref 40–134)
HCT VFR BLD AUTO: 36.9 % (ref 34.8–46.1)
HGB BLD-MCNC: 12.3 G/DL (ref 11.5–15.4)
IMM GRANULOCYTES # BLD AUTO: 0.02 THOUSAND/UL (ref 0–0.2)
IMM GRANULOCYTES NFR BLD AUTO: 0 % (ref 0–2)
LAB AP GYN PRIMARY INTERPRETATION: NORMAL
LYMPHOCYTES # BLD AUTO: 1.99 THOUSANDS/ÂΜL (ref 0.6–4.47)
LYMPHOCYTES NFR BLD AUTO: 21 % (ref 14–44)
Lab: NORMAL
MCH RBC QN AUTO: 30.4 PG (ref 26.8–34.3)
MCHC RBC AUTO-ENTMCNC: 33.3 G/DL (ref 31.4–37.4)
MCV RBC AUTO: 91 FL (ref 82–98)
MONOCYTES # BLD AUTO: 0.52 THOUSAND/ÂΜL (ref 0.17–1.22)
MONOCYTES NFR BLD AUTO: 6 % (ref 4–12)
NEUTROPHILS # BLD AUTO: 6.78 THOUSANDS/ÂΜL (ref 1.85–7.62)
NEUTS SEG NFR BLD AUTO: 72 % (ref 43–75)
NRBC BLD AUTO-RTO: 0 /100 WBCS
PLATELET # BLD AUTO: 217 THOUSANDS/UL (ref 149–390)
PMV BLD AUTO: 9.8 FL (ref 8.9–12.7)
RBC # BLD AUTO: 4.04 MILLION/UL (ref 3.81–5.12)
RH BLD: POSITIVE
RUBV IGG SERPL IA-ACNC: 143.8 IU/ML
SPECIMEN EXPIRATION DATE: NORMAL
WBC # BLD AUTO: 9.46 THOUSAND/UL (ref 4.31–10.16)

## 2023-04-01 LAB
HBV SURFACE AG SER QL: NORMAL
HCV AB SER QL: NORMAL
HIV 1+2 AB+HIV1 P24 AG SERPL QL IA: NORMAL
HIV 2 AB SERPL QL IA: NORMAL
HIV1 AB SERPL QL IA: NORMAL
HIV1 P24 AG SERPL QL IA: NORMAL
TREPONEMA PALLIDUM IGG+IGM AB [PRESENCE] IN SERUM OR PLASMA BY IMMUNOASSAY: NORMAL

## 2023-04-03 ENCOUNTER — TELEPHONE (OUTPATIENT)
Dept: OBGYN CLINIC | Facility: CLINIC | Age: 33
End: 2023-04-03

## 2023-04-03 DIAGNOSIS — N30.90 CYSTITIS: Primary | ICD-10-CM

## 2023-04-03 NOTE — TELEPHONE ENCOUNTER
Spoke with patient with  present as patient has language barrier  Patient does report hesitancy to initiate stream, pain with voiding  Per CS lab result note, if symptomatic to repeat urine culture  Reviewed allergies and pharmacy  Routed to on call provider

## 2023-04-03 NOTE — TELEPHONE ENCOUNTER
----- Message from Ricky Pichardo, [address] sent at 4/3/2023 12:52 PM EDT -----  Nml PNBW- see sep urine results

## 2023-04-04 LAB
2ND TRIMESTER 4 SCREEN SERPL-IMP: NORMAL
AFP ADJ MOM SERPL: 0.91
AFP INTERP AMN-IMP: NORMAL
AFP INTERP SERPL-IMP: NORMAL
AFP INTERP SERPL-IMP: NORMAL
AFP SERPL-MCNC: 23 NG/ML
AGE AT DELIVERY: 35.1 YR
GA METHOD: NORMAL
GA: 15 WEEKS
IDDM PATIENT QL: NO
MULTIPLE PREGNANCY: NO
NEURAL TUBE DEFECT RISK FETUS: NORMAL %

## 2023-04-06 ENCOUNTER — APPOINTMENT (OUTPATIENT)
Dept: LAB | Facility: HOSPITAL | Age: 33
End: 2023-04-06
Attending: STUDENT IN AN ORGANIZED HEALTH CARE EDUCATION/TRAINING PROGRAM

## 2023-04-06 DIAGNOSIS — N30.90 CYSTITIS: ICD-10-CM

## 2023-04-06 LAB

## 2023-04-07 LAB — BACTERIA UR CULT: NORMAL

## 2023-04-26 NOTE — PROGRESS NOTES
18w5d  Pap 2023   Negative   GC/CT:3/27/2023 Negative / Negative   PN1 Labs: 3/31/2023  Blood Type: O Positive:   MFM Level 1:  MFM Level 2: Schedule for 2023  AFP: 3/31/2023  28 Week Labs:  TDap:  Flu:  GBS:   Blue Folder:  Yellow Folder:  Ped Referral :  Breast pump:  L&D forms:  Delivery consent:     patient Patient's first and last name, , procedure, and correct site confirmed prior to the start of procedure.

## 2023-04-27 NOTE — PATIENT INSTRUCTIONS
Pregnancy at 23 to 22 [address]:   Now that you are in your second trimester, you have more energy  You may also be feeling hungrier than usual  You may be gaining about ½ to 1 pound a week, and your pregnancy is beginning to show  You may need to start wearing maternity clothes  As your baby gets larger, you may have other symptoms  These may include body aches or stretch marks on your abdomen, breasts, thighs, or buttocks  DISCHARGE INSTRUCTIONS:   Return to the emergency department if:   You develop a severe headache that does not go away  You have new or increased vision changes, such as blurred or spotted vision  You have new or increased swelling in your face or hands  You have vaginal spotting or bleeding  Your water broke or you feel warm water gushing or trickling from your vagina  Call your doctor or obstetrician if:   You have abdominal cramps, pressure, or tightening  You have a change in vaginal discharge  You cannot keep food or drinks down, and you are losing weight  You have chills or a fever  You have vaginal itching, burning, or pain  You have yellow, green, white, or foul-smelling vaginal discharge  You have pain or burning when you urinate, less urine than usual, or pink or bloody urine  You have questions or concerns about your condition or care  How to care for yourself at this stage of your pregnancy:       Eat a variety of healthy foods  Healthy foods include fruits, vegetables, whole-grain breads, low-fat dairy foods, beans, lean meats, and fish  Drink liquids as directed  Ask how much liquid to drink each day and which liquids are best for you  Limit caffeine to less than 200 milligrams each day  Limit your intake of fish to 2 servings each week  Choose fish low in mercury such as canned light tuna, shrimp, salmon, cod, or tilapia  Do not  eat fish high in mercury such as swordfish, tilefish, julio mackerel, and shark           Take prenatal vitamins as directed  Your need for certain vitamins and minerals, such as folic acid, increases during pregnancy  Prenatal vitamins provide some of the extra vitamins and minerals you need  Prenatal vitamins may also help to decrease the risk of certain birth defects  Talk to your healthcare provider about exercise  Moderate exercise can help you stay fit  Your healthcare provider will help you plan an exercise program that is safe for you during pregnancy  Do not smoke  Smoking increases your risk of a miscarriage and other health problems during your pregnancy  Smoking can cause your baby to be born too early or weigh less at birth  Ask your healthcare provider for information if you need help quitting  Do not drink alcohol  Alcohol passes from your body to your baby through the placenta  It can affect your baby's brain development and cause fetal alcohol syndrome (FAS)  FAS is a group of conditions that causes mental, behavior, and growth problems  Talk to your healthcare provider before you take any medicines  Many medicines may harm your baby if you take them when you are pregnant  Do not take any medicines, vitamins, herbs, or supplements without first talking to your healthcare provider  Never use illegal or street drugs (such as marijuana or cocaine) while you are pregnant  Safety tips during pregnancy:   Avoid hot tubs and saunas  Do not use a hot tub or sauna while you are pregnant, especially during your first trimester  Hot tubs and saunas may raise your baby's temperature and increase the risk of birth defects  Avoid toxoplasmosis  This is an infection caused by eating raw meat or being around infected cat feces  It can cause birth defects, miscarriages, and other problems  Wash your hands after you touch raw meat  Make sure any meat is well-cooked before you eat it  Avoid raw eggs and unpasteurized milk   Use gloves or ask someone else to clean your cat's litter box while you are pregnant  Changes happening with your baby:  By 22 weeks, your baby is about 8 inches long from the top of the head to the rump (baby's bottom)  Your baby also weighs about 1 pound  Your baby is becoming much more active  You may be able to feel the baby move inside you now  The first movements may not be that noticeable  They may feel like a fluttering sensation  As time goes on, your baby's movements will become stronger and more noticeable  What you need to know about prenatal care:  During the first 28 weeks of your pregnancy, you will see your healthcare provider once a month  Your healthcare provider will check your blood pressure and weight  You may also need the following:  A urine test  may also be done to check for sugar and protein  These can be signs of gestational diabetes or infection  Protein in your urine may also be a sign of preeclampsia  Preeclampsia is a condition that can develop during week 20 or later of your pregnancy  It causes high blood pressure, and it can cause problems with your kidneys and other organs  Fundal height  is a measurement of your uterus to check your baby's growth  This number is usually the same as the number of weeks that you have been pregnant  A fetal ultrasound  shows pictures of your baby inside your uterus  It shows your baby's development  The movement and position of your baby can also be seen  Your healthcare provider may be able to tell you what your baby's gender is during the ultrasound  Your baby's heart rate  will be checked  Follow up with your obstetrician as directed:  Write down your questions so you remember to ask them during your visits  © Copyright Rona Jackson 2022 Information is for End User's use only and may not be sold, redistributed or otherwise used for commercial purposes  The above information is an  only   It is not intended as medical advice for individual conditions or treatments  Talk to your doctor, nurse or pharmacist before following any medical regimen to see if it is safe and effective for you  Round Ligament Pain   WHAT YOU NEED TO KNOW:   What is round ligament pain? Round ligament pain is caused when ligaments are stretched as your uterus (womb) gets bigger during pregnancy  Round ligaments are found on each side of your uterus  They are bands of tissue that hold the uterus in place  Round ligament pain happens most often during the second trimester  It is a normal part of pregnancy and should stop by the third trimester  The pain is not serious and will not hurt your baby  What are the signs and symptoms of round ligament pain? Pain on one or both sides of your lower abdomen or groin that may move up to your hip    Spasms in the muscles in your abdomen    Pain that lasts a few seconds    Pain that happens when you exercise, sneeze, change positions, or stand quickly    How is round ligament pain diagnosed? Your healthcare provider will examine you and ask about your pain  Tell the provider when the pain started, and if you feel it on one or both sides  Your provider may ask if anything helps the pain or makes it worse  What can I do to manage my pain? Round ligament pain does not need to be treated  The following may help make you more comfortable:  Rest as often as you can  Rest can help relieve round ligament pain  You might want to lie on the side that has pain  Place a pillow under your abdomen  Keep another pillow between your knees  Move slowly  Sudden movement can stretch the ligaments and cause pain  Stand, sit, and change positions slowly  Try to tighten the muscles in your hips before you sneeze or laugh  You can also sit down and bring your knees up toward your abdomen  This can help relieve tension on the ligaments  Exercise as directed  Gentle exercise can keep the ligaments loose and strengthen core (abdominal) muscles   An example is swimming, or a yoga program designed for pregnancy  Ask your healthcare provider which exercises are safe for you and how often to exercise  For most healthy women, a good goal is to try to get at least 30 minutes of exercise every day  If activity causes pain, try not to walk too long or too far at one time  Break your exercise up into short amounts  Apply a warm compress to the area  Warmth can relieve pain and muscle spasms  Ask your healthcare provider if you can take a warm bath or use a heating pad  Keep all heat settings low  High heat can be dangerous for your baby  Do not sit in a hot tub or use hot water in your bath  You may also be able to massage the area gently while you are applying heat  Massage can help relieve pain  Ask about pain medicines  Ask your healthcare provider before you take any medicine during pregnancy, including over-the-counter pain medicines  Your healthcare provider may recommend acetaminophen to relieve the pain  Ask how much to take and how often to take it  Follow directions  Acetaminophen can cause liver damage  Too much medicine can be harmful to your baby  When should I contact my healthcare provider? You have pain that is spreading to other parts of your body  You have new or worsening pain  You have pain that lasts longer than a few minutes at a time  You have questions or concerns about your condition or care  CARE AGREEMENT:   You have the right to help plan your care  Learn about your health condition and how it may be treated  Discuss treatment options with your healthcare providers to decide what care you want to receive  You always have the right to refuse treatment  The above information is an  only  It is not intended as medical advice for individual conditions or treatments  Talk to your doctor, nurse or pharmacist before following any medical regimen to see if it is safe and effective for you    © Copyright [employer] 2022 Information is for End User's use only and may not be sold, redistributed or otherwise used for commercial purposes  [place] Contractions   AMBULATORY CARE:   Larwance Heaps contractions  are tightening and squeezing of the muscles of your uterus (womb) during pregnancy  The uterine muscles control the uterus  [place] contractions stop on their own  They are not true labor contractions and do not cause your cervix (opening to your uterus) to dilate (open)  Common symptoms include the following:   Pain or discomfort in your groin or lower abdomen that comes and goes    Your contractions are short, and do not last longer each time they happen    Your contractions do not get closer together each time    Your contractions do not get stronger or more painful each time    Your contractions stop when you change your position or rest    Seek care immediately if:   You have bleeding from your vagina  You have fluid leaking from your vagina that does not stop  You feel a gush of fluid from your vagina  Your contractions happen every 5 minutes or sooner, and last for more than 60 seconds  Your contractions begin to feel stronger or more painful  You feel a change in your baby's movement, or you feel fewer than 6 to 10 movements in an hour  Call your doctor or obstetrician if:   You have a fever  You have questions or concerns about your condition or care  Treatment for Ion Picktet contractions  may include pain medicine to relieve discomfort or pain or sedatives to relax the muscles of your uterus  If you are dehydrated, he or she may give you fluids through an IV or tell you to drink liquids  Self-care:   Change your activity or your position  when you feel contractions begin  Walk if you have been lying or sitting  Lie down if you have been standing or walking  True labor will not stop by changing your position or activity  Take a warm bath  to relax your body      Drink more "liquids  to prevent dehydration  Ask how much liquid to drink each day and which liquids are best for you  Practice your labor breathing  to decrease your discomfort  This may help you get ready for true labor  Take slow, deep breaths, or fast, short breaths  Ask your healthcare provider how to practice labor breathing  Follow up with your doctor or obstetrician as directed:  Write down your questions so you remember to ask them during your visits  © Copyright Linda Mena  Information is for End User's use only and may not be sold, redistributed or otherwise used for commercial purposes  The above information is an  only  It is not intended as medical advice for individual conditions or treatments  Talk to your doctor, nurse or pharmacist before following any medical regimen to see if it is safe and effective for you  Valuable Online Resource:    [facility] pregnancy essential guide    [email or web address] reinoso/      On the right side of the screen is a 50 page guide providing valuable information about your entire pregnancy  On the left hand side of the site you will see several other links to great information and resources that [facility] offers     If you click on the tab that says \"Pregnancy and Birth Packet\" this opens another  guide to labor and delivery information as well as breast feeding information,  care, pediatricians, car seat safety and much more     The [facility] Baby and [address] tab has a virtual tour of the new L&D unit, as well as valuable information about classes that are offered, breast feeding support, support groups and much more  I highly recommend the virtual Breast Feeding class, very informational even if you have breast fed in the past  Check for available dates ! Click around and enjoy all we have to offer!     Please note that all information in regards to locations and visiting hours have not been " updated due to 2 [facility]ardon delivery location is [address] [place] [address],[address], [id number] [facility] [address]

## 2023-04-27 NOTE — ASSESSMENT & PLAN NOTE
Sherwood Fraction is a 27 y o   18w6d  TWG 10 4 kg (23 lb)   Pt speaks [place] fluently understands some English accompanied by  who can translate fully  Patient does not desire to labor, reports it is customary in her country [facility] to have an elective   We discussed that this is not evidence-based practice however a further discussion with physician is recommended we will schedule appropriately so they can have this conversation  Appointment with MFM next week patient does desire genetic testing  Patient participates in [employer] advised that her normal routine and be continued  Feels well  Denies LOF/CTX/VB  No concerns  AFP completed Neg  Vaccines discussed    labor precautions reviewed  Encouraged adequate hydration and nutrition  Pregnancy Essential guide and Baby and Me center web site recommended

## 2023-04-28 ENCOUNTER — ROUTINE PRENATAL (OUTPATIENT)
Dept: OBGYN CLINIC | Facility: CLINIC | Age: 33
End: 2023-04-28

## 2023-04-28 VITALS — SYSTOLIC BLOOD PRESSURE: 122 MMHG | BODY MASS INDEX: 31.79 KG/M2 | HEIGHT: 67 IN | DIASTOLIC BLOOD PRESSURE: 76 MMHG

## 2023-04-28 DIAGNOSIS — Z34.02 ENCOUNTER FOR SUPERVISION OF NORMAL FIRST PREGNANCY IN SECOND TRIMESTER: Primary | ICD-10-CM

## 2023-04-28 PROBLEM — Z3A.19 19 WEEKS GESTATION OF PREGNANCY: Status: ACTIVE | Noted: 2023-03-23

## 2023-04-28 LAB
SL AMB  POCT GLUCOSE, UA: NORMAL
SL AMB POCT URINE PROTEIN: NORMAL

## 2023-04-28 NOTE — PROGRESS NOTES
Problem   19 Weeks Gestation of Pregnancy    Blood Type: O+  Antibody   Pap Neg/Neg GC/CT -  Neg  PN1 Labs-  Hgb: 12 3/36 9, early Glucola normal   28 Week Labs-  COVID vaccine-     Blue folder- reviewed  Yellow folder-    Genetic screening-   AFP- given and to be done in 4 days  Level 1- appt 3/30  Level 2-    Yellow folder-  TDAP -   Rhogam- n/a  Delivery consent-  Breast pump -   Pediatrician -    Perineal massage -  GBS swab -   IOL -               19 weeks gestation of pregnancy  Esa Mccarty is a 27 y o   18w6d  TWG 10 4 kg (23 lb)   Pt speaks [place] fluently understands some English accompanied by  who can translate fully  Patient does not desire to labor, reports it is customary in her country [facility] to have an elective   We discussed that this is not evidence-based practice however a further discussion with physician is recommended we will schedule appropriately so they can have this conversation  Appointment with MFM next week patient does desire genetic testing  Patient participates in [employer] advised that her normal routine and be continued  Feels well  Denies LOF/CTX/VB  No concerns  AFP completed Neg  Vaccines discussed    labor precautions reviewed  Encouraged adequate hydration and nutrition  Pregnancy Essential guide and Baby and Me center web site recommended

## 2023-05-08 ENCOUNTER — ROUTINE PRENATAL (OUTPATIENT)
Dept: PERINATAL CARE | Facility: OTHER | Age: 33
End: 2023-05-08

## 2023-05-08 VITALS
DIASTOLIC BLOOD PRESSURE: 60 MMHG | SYSTOLIC BLOOD PRESSURE: 110 MMHG | HEIGHT: 67 IN | WEIGHT: 210.4 LBS | BODY MASS INDEX: 33.02 KG/M2 | HEART RATE: 75 BPM

## 2023-05-08 DIAGNOSIS — Z36.3 ENCOUNTER FOR ANTENATAL SCREENING FOR MALFORMATION: ICD-10-CM

## 2023-05-08 DIAGNOSIS — Z3A.20 20 WEEKS GESTATION OF PREGNANCY: ICD-10-CM

## 2023-05-08 DIAGNOSIS — Z36.86 ENCOUNTER FOR ANTENATAL SCREENING FOR CERVICAL LENGTH: ICD-10-CM

## 2023-05-08 DIAGNOSIS — O09.522 AMA (ADVANCED MATERNAL AGE) MULTIGRAVIDA 35+, SECOND TRIMESTER: ICD-10-CM

## 2023-05-08 DIAGNOSIS — Z3A.19 19 WEEKS GESTATION OF PREGNANCY: Primary | ICD-10-CM

## 2023-05-08 NOTE — PROGRESS NOTES
"Patient chose to have [facility] Non-invasive Prenatal Screen with fetal sex  Patient given brochure and is aware [facility] will contact their insurance and coordinate coverage  Patient made aware she will need to respond to text message or e-mail from [facility] within 2 business days or testing will be run through insurance  Patient informed text message will come from area code  \"415\"  Provided [employer] # [phone] and web site : Write.my@google com  \"[employer]ruver your test online\" card with barcode and test tube ID provided to patient  Reviewed [facility]'s web site states 5-7 business days for results via their portal    [employer] message will be sent to patient when MFM receives results /provider reviews  2 vials of blood drawn from right arm by  GISELL Walker RN  Patient tolerated blood draw without difficulty  Specimens labeled with patient identifiers (name, date of birth, specimen collection date), order and specimen were verified with patient, packed and sent via [employer]  Copy of lab order scanned to Epic media  Maternal Fetal Medicine will have results in approximately 7-10 business days and will call patient or notify via [address]  Patient aware viewing lab result online will reveal fetal sex if ordered  Patient verbalized understanding of all instructions and no questions at this time    "

## 2023-05-08 NOTE — LETTER
"May 9, 2023    Susanshravan Sever, Nidiaitzelnisha [address]  [address] [address]    Patient: Alonso Kirk   YOB: 1990   Date of Visit: 2023   Gestational age Nazanin Viveros of this communication: Routine, reassuring anatomic survey       Dear Dr Munir Springer,    This patient was seen recently in our  office  The content of my evaluation today is in the ultrasound report under \"OB Procedures\" tab  Please don't hesitate to contact our office with any concerns or questions       Sincerely,      Cyrus Townsend MD  Attending Physician, [place]        "

## 2023-05-08 NOTE — PATIENT INSTRUCTIONS
You elected to have non-invasive screening for genetic syndrome performed for your pregnancy  This involves a blood test to check for the four most common genetic syndromes (Trisomy 21, Trisomy 13, Trisomy 25, and sex chromosome abnormalities)  It also MAY report the biologic sex of the fetus if you opted to learn this information  You can call our office to verbally review results to avoid inadvertently learning this information via [employer], if desired  Results will be visible in your Fancy portal 7-10 business days from when the test is drawn  Please follow all instructions regarding insurance cost/coverage provided to you today  Please contact our office with any concerns or questions

## 2023-05-08 NOTE — PROGRESS NOTES
"[address]: Ms Sonny Joe was seen today for anatomic survey and cervical length screening ultrasound  See ultrasound report under \"OB Procedures\" tab  MDM:   I  Diagnoses/Problems addressed:  aneuploidy screening, advanced maternal age in pregnancy  II  Data: I reviewed notes from initial prenatal, I ordered NIPS  III    Risk of morbidity: Low    Please don't hesitate to contact our office with any concerns or questions   -Ana Maria Hooker MD      "

## 2023-05-08 NOTE — PROGRESS NOTES
Ultrasound Probe Disinfection    A transvaginal ultrasound was performed  Prior to use, disinfection was performed with High Level Disinfection Process (Cliqon)  Probe serial number M1: [id number]   was used        [facility]  05/08/23  2:24 PM

## 2023-05-09 ENCOUNTER — TELEPHONE (OUTPATIENT)
Dept: PERINATAL CARE | Facility: OTHER | Age: 33
End: 2023-05-09

## 2023-05-09 NOTE — TELEPHONE ENCOUNTER
Left voicemail requesting patient to call back and schedule appointment, with office number for return call [phone] and [phone]  Level 1 growth ultrasound needed at 32 week GA

## 2023-05-26 ENCOUNTER — ROUTINE PRENATAL (OUTPATIENT)
Dept: OBGYN CLINIC | Facility: MEDICAL CENTER | Age: 33
End: 2023-05-26

## 2023-05-26 VITALS — BODY MASS INDEX: 33.59 KG/M2 | WEIGHT: 214 LBS | SYSTOLIC BLOOD PRESSURE: 110 MMHG | DIASTOLIC BLOOD PRESSURE: 66 MMHG

## 2023-05-26 DIAGNOSIS — O99.212 OBESITY AFFECTING PREGNANCY IN SECOND TRIMESTER: ICD-10-CM

## 2023-05-26 DIAGNOSIS — Z3A.23 23 WEEKS GESTATION OF PREGNANCY: Primary | ICD-10-CM

## 2023-05-26 LAB
SL AMB  POCT GLUCOSE, UA: NORMAL
SL AMB POCT URINE PROTEIN: NORMAL

## 2023-05-26 NOTE — PROGRESS NOTES
PNV  23w0d    Patient denies any lof, vb or ctx  Patient has +fm  Patient urine is -/-  Patient has no concerns today

## 2023-05-26 NOTE — PROGRESS NOTES
27 y o   at 23w0d presents for routine prenatal visit  She denies contractions/leakage of fluid/vaginal bleeding  She feels good fetal movement  Problem List Items Addressed This Visit        Other    Obesity affecting pregnancy in second trimester  Weight gain 20# - she does cross fit regularly  Discussed continuing healthy diet and exercise   Other Visit Diagnoses     23 weeks gestation of pregnancy    -  Primary  Needs to schedule 30-32 wk growth US  Has MFM phone number  Pt desires 1ltcs  Sister had traumatic delivery recently  Pt from [place] where  is very common  We discussed at length risk/benefit of  vs    She plans for only one baby and is interested in b/l salpingectomy at time of surgery  We discussed risk of bleeding, infection, injury to surrounding structures such as bowel and bladder  Aware of longer recovery time, need for narcotics  She expresses understanding and would like to schedule  Will send message today  Would like to meet MD doing surgery      Relevant Orders    Anemia Panel w/Reflex, OB    CBC and differential    Glucose, 1H PG    RPR-Syphilis Screening (Total Syphilis IGG/IGM)    POCT urine dip (Completed)

## 2023-05-30 ENCOUNTER — TELEPHONE (OUTPATIENT)
Dept: OBGYN CLINIC | Facility: CLINIC | Age: 33
End: 2023-05-30

## 2023-05-30 NOTE — TELEPHONE ENCOUNTER
----- Message from Ally Raymond MD sent at 2023  4:46 PM EDT -----  Regarding:   Procedure to be scheduled (IOL or CS): CS    POORNIMA: Estimated Date of Delivery: 23     Indication for delivery: elective    Requested date (s) of delivery: 9/15/23   If requested date is unavailable, is there a date by which the pt must be delivered? 23    Physician preference: None  Please schedule prenatal visit with delivering physician when scheduled          If CS, with or without tubal: WITH TUBAL

## 2023-06-01 NOTE — TELEPHONE ENCOUNTER
Called L & D, per Andre Badillo pt scheduled for primary  SECTION & Tubal, Friday, 9/15/23 at 0800  Called pt to inform of date & time  Per communication consent, LEFT MESSAGE for pt to return call to confirm message was received  My direct ext was given

## 2023-06-02 NOTE — TELEPHONE ENCOUNTER
Spoke with pt's   He is listed on Med  Consent  Informed of date & time for  SECTION & tubal  Reviewed directions/protocol for [address]   Will route to [facility] on-call when September schedule is available

## 2023-06-20 ENCOUNTER — ROUTINE PRENATAL (OUTPATIENT)
Dept: OBGYN CLINIC | Facility: CLINIC | Age: 33
End: 2023-06-20
Payer: COMMERCIAL

## 2023-06-20 VITALS
BODY MASS INDEX: 34.84 KG/M2 | SYSTOLIC BLOOD PRESSURE: 114 MMHG | WEIGHT: 216.8 LBS | DIASTOLIC BLOOD PRESSURE: 70 MMHG | HEIGHT: 66 IN

## 2023-06-20 DIAGNOSIS — Z3A.26 26 WEEKS GESTATION OF PREGNANCY: Primary | ICD-10-CM

## 2023-06-20 LAB
SL AMB  POCT GLUCOSE, UA: NORMAL
SL AMB POCT URINE PROTEIN: NORMAL

## 2023-06-20 PROCEDURE — 81002 URINALYSIS NONAUTO W/O SCOPE: CPT | Performed by: STUDENT IN AN ORGANIZED HEALTH CARE EDUCATION/TRAINING PROGRAM

## 2023-06-20 PROCEDURE — PNV: Performed by: STUDENT IN AN ORGANIZED HEALTH CARE EDUCATION/TRAINING PROGRAM

## 2023-06-20 NOTE — PROGRESS NOTES
26 weeks gestation of pregnancy  26 yo here for ob visit   at 26+4  No contractions, leaking or bleeding  Good fetal movement  Has immigration visit this week and we discussed safe and recommended vaccines in pregnancy  Going to do labs this week  Return 2 wks

## 2023-06-20 NOTE — ASSESSMENT & PLAN NOTE
26 yo here for ob visit   at 26+4  No contractions, leaking or bleeding  Good fetal movement  Has immigration visit this week and we discussed safe and recommended vaccines in pregnancy  Going to do labs this week  Return 2 wks

## 2023-06-20 NOTE — PROGRESS NOTES
Prenatal Visit [id number]    Patient denies any lof, vb or ctx  Patient has +fm  Patient urine is neg/neg  Patient said she will get her labs done tomorrow   Having a Boy  28 week labs is not done  Patient has questions about what vaccines is not safe in pregnancy  She have a appointment tomorrow with immigration

## 2023-06-30 ENCOUNTER — APPOINTMENT (OUTPATIENT)
Dept: LAB | Facility: HOSPITAL | Age: 33
End: 2023-06-30
Attending: STUDENT IN AN ORGANIZED HEALTH CARE EDUCATION/TRAINING PROGRAM
Payer: COMMERCIAL

## 2023-06-30 DIAGNOSIS — Z3A.23 23 WEEKS GESTATION OF PREGNANCY: ICD-10-CM

## 2023-06-30 LAB
BASOPHILS # BLD AUTO: 0.04 THOUSANDS/ÂΜL (ref 0–0.1)
BASOPHILS NFR BLD AUTO: 0 % (ref 0–1)
EOSINOPHIL # BLD AUTO: 0.09 THOUSAND/ÂΜL (ref 0–0.61)
EOSINOPHIL NFR BLD AUTO: 1 % (ref 0–6)
ERYTHROCYTE [DISTWIDTH] IN BLOOD BY AUTOMATED COUNT: 12.1 % (ref 11.6–15.1)
GLUCOSE 1H P 50 G GLC PO SERPL-MCNC: 67 MG/DL (ref 40–134)
HCT VFR BLD AUTO: 37.8 % (ref 34.8–46.1)
HGB BLD-MCNC: 12.6 G/DL (ref 11.5–15.4)
IMM GRANULOCYTES # BLD AUTO: 0.05 THOUSAND/UL (ref 0–0.2)
IMM GRANULOCYTES NFR BLD AUTO: 1 % (ref 0–2)
LYMPHOCYTES # BLD AUTO: 1.59 THOUSANDS/ÂΜL (ref 0.6–4.47)
LYMPHOCYTES NFR BLD AUTO: 16 % (ref 14–44)
MCH RBC QN AUTO: 31.3 PG (ref 26.8–34.3)
MCHC RBC AUTO-ENTMCNC: 33.3 G/DL (ref 31.4–37.4)
MCV RBC AUTO: 94 FL (ref 82–98)
MONOCYTES # BLD AUTO: 0.61 THOUSAND/ÂΜL (ref 0.17–1.22)
MONOCYTES NFR BLD AUTO: 6 % (ref 4–12)
NEUTROPHILS # BLD AUTO: 7.76 THOUSANDS/ÂΜL (ref 1.85–7.62)
NEUTS SEG NFR BLD AUTO: 76 % (ref 43–75)
NRBC BLD AUTO-RTO: 0 /100 WBCS
PLATELET # BLD AUTO: 215 THOUSANDS/UL (ref 149–390)
PMV BLD AUTO: 9.9 FL (ref 8.9–12.7)
RBC # BLD AUTO: 4.03 MILLION/UL (ref 3.81–5.12)
TREPONEMA PALLIDUM IGG+IGM AB [PRESENCE] IN SERUM OR PLASMA BY IMMUNOASSAY: NORMAL
WBC # BLD AUTO: 10.14 THOUSAND/UL (ref 4.31–10.16)

## 2023-06-30 PROCEDURE — 36415 COLL VENOUS BLD VENIPUNCTURE: CPT

## 2023-06-30 PROCEDURE — 86780 TREPONEMA PALLIDUM: CPT

## 2023-06-30 PROCEDURE — 85025 COMPLETE CBC W/AUTO DIFF WBC: CPT

## 2023-07-07 ENCOUNTER — ROUTINE PRENATAL (OUTPATIENT)
Dept: OBGYN CLINIC | Facility: CLINIC | Age: 33
End: 2023-07-07
Payer: COMMERCIAL

## 2023-07-07 VITALS
DIASTOLIC BLOOD PRESSURE: 70 MMHG | BODY MASS INDEX: 35.03 KG/M2 | SYSTOLIC BLOOD PRESSURE: 120 MMHG | WEIGHT: 218 LBS | HEIGHT: 66 IN

## 2023-07-07 DIAGNOSIS — Z34.03 ENCOUNTER FOR SUPERVISION OF NORMAL FIRST PREGNANCY IN THIRD TRIMESTER: Primary | ICD-10-CM

## 2023-07-07 PROBLEM — Z3A.29 29 WEEKS GESTATION OF PREGNANCY: Status: ACTIVE | Noted: 2023-03-23

## 2023-07-07 LAB
SL AMB  POCT GLUCOSE, UA: NORMAL
SL AMB POCT URINE PROTEIN: NORMAL

## 2023-07-07 PROCEDURE — PNV: Performed by: OBSTETRICS & GYNECOLOGY

## 2023-07-07 PROCEDURE — 81002 URINALYSIS NONAUTO W/O SCOPE: CPT | Performed by: OBSTETRICS & GYNECOLOGY

## 2023-07-07 NOTE — PATIENT INSTRUCTIONS
Pregnancy at 32 to 30 Weeks   AMBULATORY CARE:   What changes are happening to your body: You may notice new symptoms such as shortness of breath, heartburn, or swelling of your ankles and feet. You may also have trouble sleeping or contractions. Seek care immediately if:   You develop a severe headache that does not go away. You have new or increased vision changes, such as blurred or spotted vision. You have new or increased swelling in your face or hands. You have vaginal spotting or bleeding. Your water broke or you feel warm water gushing or trickling from your vagina. Call your doctor or obstetrician if:   You have more than 5 contractions in 1 hour. You notice any changes in your baby's movements. You have abdominal cramps, pressure, or tightening. You have a change in vaginal discharge. You have chills or a fever. You have vaginal itching, burning, or pain. You have yellow, green, white, or foul-smelling vaginal discharge. You have pain or burning when you urinate, less urine than usual, or pink or bloody urine. You have questions or concerns about your condition or care. How to care for yourself at this stage of your pregnancy:       Eat a variety of healthy foods. Healthy foods include fruits, vegetables, whole-grain breads, low-fat dairy foods, beans, lean meats, and fish. Drink liquids as directed. Ask how much liquid to drink each day and which liquids are best for you. Limit caffeine to less than 200 milligrams each day. Limit your intake of fish to 2 servings each week. Choose fish low in mercury such as canned light tuna, shrimp, salmon, cod, or tilapia. Do not  eat fish high in mercury such as swordfish, tilefish, julio mackerel, and shark. Manage heartburn  by eating 4 or 5 small meals each day instead of large meals. Avoid spicy food. Manage swelling  by lying down and putting your feet up. Take prenatal vitamins as directed. Your need for certain vitamins and minerals, such as folic acid, increases during pregnancy. Prenatal vitamins provide some of the extra vitamins and minerals you need. Prenatal vitamins may also help to decrease the risk of certain birth defects. Talk to your healthcare provider about exercise. Moderate exercise can help you stay fit. Your healthcare provider will help you plan an exercise program that is safe for you during pregnancy. Do not smoke. Smoking increases your risk of a miscarriage and other health problems during your pregnancy. Smoking can cause your baby to be born too early or weigh less at birth. Ask your healthcare provider for information if you need help quitting. Do not drink alcohol. Alcohol passes from your body to your baby through the placenta. It can affect your baby's brain development and cause fetal alcohol syndrome (FAS). FAS is a group of conditions that causes mental, behavior, and growth problems. Talk to your healthcare provider before you take any medicines. Many medicines may harm your baby if you take them when you are pregnant. Do not take any medicines, vitamins, herbs, or supplements without first talking to your healthcare provider. Never use illegal or street drugs (such as marijuana or cocaine) while you are pregnant. Safety tips during pregnancy:   Avoid hot tubs and saunas. Do not use a hot tub or sauna while you are pregnant, especially during your first trimester. Hot tubs and saunas may raise your baby's temperature and increase the risk of birth defects. Avoid toxoplasmosis. This is an infection caused by eating raw meat or being around infected cat feces. It can cause birth defects, miscarriages, and other problems. Wash your hands after you touch raw meat. Make sure any meat is well-cooked before you eat it. Avoid raw eggs and unpasteurized milk. Use gloves or ask someone else to clean your cat's litter box while you are pregnant. Changes that are happening with your baby:  By 30 weeks, your baby may weigh more than 3 pounds. Your baby may be about 11 inches long from the top of the head to the rump (baby's bottom). Your baby's eyes open and close now. Your baby's kicks and movements are more forceful at this time. What you need to know about prenatal care: Your healthcare provider will check your blood pressure and weight. You may also need the following:  Blood tests  may be done to check for anemia or blood type. A urine test  may also be done to check for sugar and protein. These can be signs of gestational diabetes or infection. Protein in your urine may also be a sign of preeclampsia. Preeclampsia is a condition that can develop during week 20 or later of your pregnancy. It causes high blood pressure, and it can cause problems with your kidneys and other organs. A Tdap vaccine and flu vaccine  may be recommended by your healthcare provider. A gestational diabetes screen  may be done. Your healthcare provider may order either a 1-step or 2-step oral glucose tolerance test (OGTT). 1-step OGTT:  Your blood sugar level will be tested after you have not eaten for 8 hours (fasting). You will then be given a glucose drink. Your level will be tested again 1 hour and 2 hours after you finish the drink. 2-step OGTT:  You do not have to fast for the first part of the test. You will have the glucose drink at any time of day. Your blood sugar level will be checked 1 hour later. If your blood sugar is higher than a certain level, another test will be ordered. You will fast and your blood sugar level will be tested. You will have the glucose drink. Your blood will be tested again 1 hour, 2 hours, and 3 hours after you finish the glucose drink. Fundal height  is a measurement of your uterus to check your baby's growth. This number is usually the same as the number of weeks that you have been pregnant.  Your healthcare provider may also check your baby's position. Your baby's heart rate  will be checked. Follow up with your doctor or obstetrician as directed:  Write down your questions so you remember to ask them during your visits. © Copyright Solo [employer]ater 2022 Information is for End User's use only and may not be sold, redistributed or otherwise used for commercial purposes. The above information is an  only. It is not intended as medical advice for individual conditions or treatments. Talk to your doctor, nurse or pharmacist before following any medical regimen to see if it is safe and effective for you. Kick Counts in Pregnancy   AMBULATORY CARE:   Kick counts  measure how much your baby is moving in your womb. A kick from your baby can be felt as a twist, turn, swish, roll, or jab. It is common to feel your baby kicking at 26 to 28 weeks of pregnancy. You may feel your baby kick as early as 20 weeks of pregnancy. You may want to start counting at 28 weeks. Contact your doctor immediately if:   You feel a change in the number of kicks or movements of your baby. You feel fewer than 10 kicks within 2 hours. You have questions or concerns about your baby's movements. Why measure kick counts:  Your baby's movement may provide information about your baby's health. He or she may move less, or not at all, if there are problems. Your baby may move less if he or she is not getting enough oxygen or nutrition from the placenta. Do not smoke while you are pregnant. Smoking decreases the amount of oxygen that gets to your baby. Talk to your healthcare provider if you need help to quit smoking. Tell your healthcare provider as soon as you feel a change in your baby's movements. When to measure kick counts:   Measure kick counts at the same time every day. Measure kick counts when your baby is awake and most active. Your baby may be most active in the evening.     How to measure kick counts:  Check that your baby is awake before you measure kick counts. You can wake up your baby by lightly pushing on your belly, walking, or drinking something cold. Your healthcare provider may tell you different ways to measure kick counts. You may be told to do the following:  Use a chart or clock to keep track of the time you start and finish counting. Sit in a chair or lie on your left side. Place your hands on the largest part of your belly. Count until you reach 10 kicks. Write down how much time it takes to count 10 kicks. It may take 30 minutes to 2 hours to count 10 kicks. It should not take more than 2 hours to count 10 kicks. Follow up with your doctor as directed:  Write down your questions so you remember to ask them during your visits. © Copyright Guru Henriquez 2022 Information is for End User's use only and may not be sold, redistributed or otherwise used for commercial purposes. The above information is an  only. It is not intended as medical advice for individual conditions or treatments. Talk to your doctor, nurse or pharmacist before following any medical regimen to see if it is safe and effective for you. Ion Pickett Contractions   AMBULATORY CARE:   Don San contractions  are tightening and squeezing of the muscles of your uterus (womb) during pregnancy. The uterine muscles control the uterus. Brinnon Pickett contractions stop on their own. They are not true labor contractions and do not cause your cervix (opening to your uterus) to dilate (open).   Common symptoms include the following:   Pain or discomfort in your groin or lower abdomen that comes and goes    Your contractions are short, and do not last longer each time they happen    Your contractions do not get closer together each time    Your contractions do not get stronger or more painful each time    Your contractions stop when you change your position or rest    Seek care immediately if:   You have bleeding from your vagina. You have fluid leaking from your vagina that does not stop. You feel a gush of fluid from your vagina. Your contractions happen every 5 minutes or sooner, and last for more than 60 seconds. Your contractions begin to feel stronger or more painful. You feel a change in your baby's movement, or you feel fewer than 6 to 10 movements in an hour. Call your doctor or obstetrician if:   You have a fever. You have questions or concerns about your condition or care. Treatment for Dallas Pickett contractions  may include pain medicine to relieve discomfort or pain or sedatives to relax the muscles of your uterus. If you are dehydrated, he or she may give you fluids through an IV or tell you to drink liquids. Self-care:   Change your activity or your position  when you feel contractions begin. Walk if you have been lying or sitting. Lie down if you have been standing or walking. True labor will not stop by changing your position or activity. Take a warm bath  to relax your body. Drink more liquids  to prevent dehydration. Ask how much liquid to drink each day and which liquids are best for you. Practice your labor breathing  to decrease your discomfort. This may help you get ready for true labor. Take slow, deep breaths, or fast, short breaths. Ask your healthcare provider how to practice labor breathing. Follow up with your doctor or obstetrician as directed:  Write down your questions so you remember to ask them during your visits. © Copyright Awilda De La Vega 2022 Information is for End User's use only and may not be sold, redistributed or otherwise used for commercial purposes. The above information is an  only. It is not intended as medical advice for individual conditions or treatments. Talk to your doctor, nurse or pharmacist before following any medical regimen to see if it is safe and effective for you.   Round Ligament Pain   WHAT YOU NEED TO KNOW:   What is round ligament pain? Round ligament pain is caused when ligaments are stretched as your uterus (womb) gets bigger during pregnancy. Round ligaments are found on each side of your uterus. They are bands of tissue that hold the uterus in place. Round ligament pain happens most often during the second trimester. It is a normal part of pregnancy and should stop by the third trimester. The pain is not serious and will not hurt your baby. What are the signs and symptoms of round ligament pain? Pain on one or both sides of your lower abdomen or groin that may move up to your hip    Spasms in the muscles in your abdomen    Pain that lasts a few seconds    Pain that happens when you exercise, sneeze, change positions, or stand quickly    How is round ligament pain diagnosed? Your healthcare provider will examine you and ask about your pain. Tell the provider when the pain started, and if you feel it on one or both sides. Your provider may ask if anything helps the pain or makes it worse. What can I do to manage my pain? Round ligament pain does not need to be treated. The following may help make you more comfortable:  Rest as often as you can. Rest can help relieve round ligament pain. You might want to lie on the side that has pain. Place a pillow under your abdomen. Keep another pillow between your knees. Move slowly. Sudden movement can stretch the ligaments and cause pain. Stand, sit, and change positions slowly. Try to tighten the muscles in your hips before you sneeze or laugh. You can also sit down and bring your knees up toward your abdomen. This can help relieve tension on the ligaments. Exercise as directed. Gentle exercise can keep the ligaments loose and strengthen core (abdominal) muscles. An example is swimming, or a yoga program designed for pregnancy. Ask your healthcare provider which exercises are safe for you and how often to exercise.  For most healthy women, a good goal is to try to get at least 30 minutes of exercise every day. If activity causes pain, try not to walk too long or too far at one time. Break your exercise up into short amounts. Apply a warm compress to the area. Warmth can relieve pain and muscle spasms. Ask your healthcare provider if you can take a warm bath or use a heating pad. Keep all heat settings low. High heat can be dangerous for your baby. Do not sit in a hot tub or use hot water in your bath. You may also be able to massage the area gently while you are applying heat. Massage can help relieve pain. Ask about pain medicines. Ask your healthcare provider before you take any medicine during pregnancy, including over-the-counter pain medicines. Your healthcare provider may recommend acetaminophen to relieve the pain. Ask how much to take and how often to take it. Follow directions. Acetaminophen can cause liver damage. Too much medicine can be harmful to your baby. When should I contact my healthcare provider? You have pain that is spreading to other parts of your body. You have new or worsening pain. You have pain that lasts longer than a few minutes at a time. You have questions or concerns about your condition or care. CARE AGREEMENT:   You have the right to help plan your care. Learn about your health condition and how it may be treated. Discuss treatment options with your healthcare providers to decide what care you want to receive. You always have the right to refuse treatment. The above information is an  only. It is not intended as medical advice for individual conditions or treatments. Talk to your doctor, nurse or pharmacist before following any medical regimen to see if it is safe and effective for you. © Copyright Padmini Aguero 2022 Information is for End User's use only and may not be sold, redistributed or otherwise used for commercial purposes.

## 2023-07-07 NOTE — PROGRESS NOTES
Problem   29 Weeks Gestation of Pregnancy    Pt is requesting Elective C section     Blood Type: O+  Antibody   Pap Neg/Neg GC/CT -  Neg  PN1 Labs-  Hgb: 12.3/36.9, early Glucola normal   28 Week Labs-  COVID vaccine-     Blue folder- reviewed  Yellow folder-    Genetic screening-   AFP- given and to be done in 4 days  Level 1- appt 3/30  Level 2-    Yellow folder-  TDAP - completed outside facility, scanned to chart   Rhogam- n/a  Delivery consent-  Breast pump -   Pediatrician -    Perineal massage -  GBS swab -   IOL -               29 weeks gestation of pregnancy  Gerald Hull is a 27 y.o. Valerie Medal  29w0d who presents for routine PNV. 28 week labs reviewed: NML  TWG 10.9 kg (23 lb 15.9 oz)   Denies OB complaints. Good fetal movement. Denies contractions, cramping, leakage of fluid or vaginal bleeding. Tdap vaccine completed on 23 at [address] , receipt scanned to chart   Reviewed  labor precautions and FKCs.    Advised to start Perineal massage at 34-36 weeks   Pregnancy Essential guide and Baby and Me web site recommended

## 2023-07-07 NOTE — PROGRESS NOTES
29w0d  Pap 2023. Negative HPV Negative   GC/CT:3/27/2023 Neg/ Neg  PN1 Labs: 3/31/2023  Blood Type: O  Positive :   MFM Level 1:  MFM Level 2: 2023  Patient has an ultra sound schedule with MFM on 2023  AFP:   28 Week Labs: 2023 Normal   TDap:2023 [facility]   Flu:  GBS:   Blue Folder: Given   Yellow Folder:Given   Ped Referral : given   Breast pump:  L&D forms:  Delivery consent:     Patient reports positive fetal movements with no concerns .

## 2023-07-07 NOTE — ASSESSMENT & PLAN NOTE
Jason Acevedo is a 27 y.o. Tino Kearns  29w0d who presents for routine PNV. 28 week labs reviewed: NML  TWG 10.9 kg (23 lb 15.9 oz)   Denies OB complaints. Good fetal movement. Denies contractions, cramping, leakage of fluid or vaginal bleeding. Tdap vaccine completed on 23 at [address] , receipt scanned to chart   Reviewed  labor precautions and FKCs.    Advised to start Perineal massage at 34-36 weeks   Pregnancy Essential guide and Baby and Me web site recommended

## 2023-07-18 ENCOUNTER — ROUTINE PRENATAL (OUTPATIENT)
Dept: OBGYN CLINIC | Facility: CLINIC | Age: 33
End: 2023-07-18
Payer: COMMERCIAL

## 2023-07-18 VITALS
BODY MASS INDEX: 35.23 KG/M2 | SYSTOLIC BLOOD PRESSURE: 100 MMHG | HEIGHT: 66 IN | WEIGHT: 219.2 LBS | DIASTOLIC BLOOD PRESSURE: 60 MMHG

## 2023-07-18 DIAGNOSIS — Z3A.30 30 WEEKS GESTATION OF PREGNANCY: Primary | ICD-10-CM

## 2023-07-18 LAB
SL AMB  POCT GLUCOSE, UA: NORMAL
SL AMB POCT URINE PROTEIN: NORMAL

## 2023-07-18 PROCEDURE — PNV

## 2023-07-18 PROCEDURE — 81002 URINALYSIS NONAUTO W/O SCOPE: CPT

## 2023-07-18 NOTE — ASSESSMENT & PLAN NOTE
Shira Rojas is a 27 y.o.   30w4d who presents for routine PNV. 28 week labs reviewed: wnl  Prepregnancy BMI 31.33 with a goal weight gain 5 kg (11 lb)-9 kg (19 lb). TWG 11.4 kg (25 lb 3.1 oz)  Denies OB complaints. Good fetal movement. Denies CTX/LOF/VB     Breast pump order submitted. Pediatric referral placed. 32 week growth scan scheduled   Patient scheduled for elective  with Dr. Michael Sung in September, advised patient MD is no longer with practice. Next appt rescheduled with MD to discuss elective  and reschedule with new delivering provider. Reviewed  labor precautions and FKCs.

## 2023-07-18 NOTE — PROGRESS NOTES
Problem   30 Weeks Gestation of Pregnancy    Pt is requesting Elective C section     Blood Type: O+  Antibody   Pap Neg/Neg GC/CT -  Neg  PN1 Labs-  Hgb: 12.3/36.9, early Glucola normal   28 Week Labs- nml  COVID vaccine-     Blue folder- reviewed  Yellow folder-    Genetic screening-   AFP- given and to be done in 4 days  Level 1- appt 3/30  Level 2-      Yellow folder-  TDAP - completed outside facility, scanned to chart   Rhogam- n/a  Delivery consent-   Breast pump - order submitted  Pediatrician - referral placed    Perineal massage -  GBS swab -   IOL -               30 weeks gestation of pregnancy  [facility] 27 [facility] who presents for routine PNV. 28 week labs reviewed: wnl  Prepregnancy BMI 31.33 with a goal weight gain 5 kg (11 lb)-9 kg (19 lb). TWG 11.4 kg (25 lb 3.1 oz)  Denies OB complaints. Good fetal movement. Denies CTX/LOF/VB     Breast pump order submitted. Pediatric referral placed. 32 week growth scan scheduled   Patient scheduled for elective  with Dr. Tracey Salas in September, advised patient MD is no longer with practice. Next appt rescheduled with MD to discuss elective  and reschedule with new delivering provider. Reviewed  labor precautions and FKCs.

## 2023-07-18 NOTE — PATIENT INSTRUCTIONS
Pregnancy at 31 to 34 Weeks   AMBULATORY CARE:   Changes happening with your body: You may continue to have symptoms such as shortness of breath, heartburn, contractions, or swelling of your ankles and feet. You may be gaining about 1 pound a week now. Seek care immediately if:   You develop a severe headache that does not go away. You have new or increased vision changes, such as blurred or spotted vision. You have new or increased swelling in your face or hands. You have vaginal spotting or bleeding. Your water broke or you feel warm water gushing or trickling from your vagina. Call your obstetrician if:   You have more than 5 contractions in 1 hour. You notice any changes in your baby's movements. You have abdominal cramps, pressure, or tightening. You have a change in vaginal discharge. You have chills or a fever. You have vaginal itching, burning, or pain. You have yellow, green, white, or foul-smelling vaginal discharge. You have pain or burning when you urinate, less urine than usual, or pink or bloody urine. You have questions or concerns about your condition or care. How to care for yourself at this stage of your pregnancy:       Eat a variety of healthy foods. Healthy foods include fruits, vegetables, whole-grain breads, low-fat dairy foods, beans, lean meats, and fish. Drink liquids as directed. Ask how much liquid to drink each day and which liquids are best for you. Limit caffeine to less than 200 milligrams each day. Limit your intake of fish to 2 servings each week. Choose fish low in mercury such as canned light tuna, shrimp, salmon, cod, or tilapia. Do not  eat fish high in mercury such as swordfish, tilefish, julio mackerel, and shark. Manage heartburn  by eating 4 or 5 small meals each day instead of large meals. Avoid spicy food. Manage swelling  by lying down and putting your feet up. Take prenatal vitamins as directed.   Your need for certain vitamins and minerals, such as folic acid, increases during pregnancy. Prenatal vitamins provide some of the extra vitamins and minerals you need. Prenatal vitamins may also help to decrease the risk of certain birth defects. Talk to your healthcare provider about exercise. Moderate exercise can help you stay fit. Your healthcare provider will help you plan an exercise program that is safe for you during pregnancy. Do not smoke. Smoking increases your risk of a miscarriage and other health problems during your pregnancy. Smoking can cause your baby to be born too early or weigh less at birth. Ask your healthcare provider for information if you need help quitting. Do not drink alcohol. Alcohol passes from your body to your baby through the placenta. It can affect your baby's brain development and cause fetal alcohol syndrome (FAS). FAS is a group of conditions that causes mental, behavior, and growth problems. Talk to your healthcare provider before you take any medicines. Many medicines may harm your baby if you take them when you are pregnant. Do not take any medicines, vitamins, herbs, or supplements without first talking to your healthcare provider. Never use illegal or street drugs (such as marijuana or cocaine) while you are pregnant. Safety tips during pregnancy:   Avoid hot tubs and saunas. Do not use a hot tub or sauna while you are pregnant, especially during your first trimester. Hot tubs and saunas may raise your baby's temperature and increase the risk of birth defects. Avoid toxoplasmosis. This is an infection caused by eating raw meat or being around infected cat feces. It can cause birth defects, miscarriages, and other problems. Wash your hands after you touch raw meat. Make sure any meat is well-cooked before you eat it. Avoid raw eggs and unpasteurized milk. Use gloves or ask someone else to clean your cat's litter box while you are pregnant. Changes happening with your baby:  By 34 weeks, your baby may weigh more than 5 pounds. Your baby will be about 12 ½ inches long from the top of the head to the rump (baby's bottom). Your baby is gaining about ½ pound a week. Your baby's eyes open and close now. Your baby's kicks and movements are more forceful at this time. What you need to know about prenatal care: Your healthcare provider will check your blood pressure and weight. You may also need the following:  A urine test  may also be done to check for sugar and protein. These can be signs of gestational diabetes or infection. Protein in your urine may also be a sign of preeclampsia. Preeclampsia is a condition that can develop during week 20 or later of your pregnancy. It causes high blood pressure, and it can cause problems with your kidneys and other organs. A gestational diabetes screen  may be done. Your healthcare provider may order either a 1-step or 2-step oral glucose tolerance test (OGTT). 1-step OGTT:  Your blood sugar level will be tested after you have not eaten for 8 hours (fasting). You will then be given a glucose drink. Your level will be tested again 1 hour and 2 hours after you finish the drink. 2-step OGTT:  You do not have to fast for the first part of the test. You will have the glucose drink at any time of day. Your blood sugar level will be checked 1 hour later. If your blood sugar is higher than a certain level, another test will be ordered. You will fast and your blood sugar level will be tested. You will have the glucose drink. Your blood will be tested again 1 hour, 2 hours, and 3 hours after you finish the glucose drink. A Tdap vaccine  may be recommended by your healthcare provider. Fundal height  is a measurement of your uterus to check your baby's growth. This number is usually the same as the number of weeks that you have been pregnant.  Your healthcare provider may also check your baby's position. Your baby's heart rate  will be checked. Follow up with your obstetrician as directed:  Write down your questions so you remember to ask them during your visits. © Copyright Tangela Lauren 2022 Information is for End User's use only and may not be sold, redistributed or otherwise used for commercial purposes. The above information is an  only. It is not intended as medical advice for individual conditions or treatments. Talk to your doctor, nurse or pharmacist before following any medical regimen to see if it is safe and effective for you.

## 2023-07-18 NOTE — PROGRESS NOTES
Prenatal Visit 30w4d    Patient denies any lof, vb or ctx. Patient has +fm. Patient urine is neg/neg  Having a Boy  Tdap utd  Yellow folder given and reviewed  Delivery consent signed today  Patient have breast pump info already. She will order herself.  schedule for 9/15/23  Patient has no concerns today.

## 2023-07-19 LAB
DME PARACHUTE DELIVERY DATE REQUESTED: NORMAL
DME PARACHUTE ITEM DESCRIPTION: NORMAL
DME PARACHUTE ORDER STATUS: NORMAL
DME PARACHUTE SUPPLIER NAME: NORMAL
DME PARACHUTE SUPPLIER PHONE: NORMAL

## 2023-07-31 ENCOUNTER — TELEPHONE (OUTPATIENT)
Dept: OBGYN CLINIC | Facility: CLINIC | Age: 33
End: 2023-07-31

## 2023-07-31 ENCOUNTER — ULTRASOUND (OUTPATIENT)
Dept: PERINATAL CARE | Facility: OTHER | Age: 33
End: 2023-07-31
Payer: COMMERCIAL

## 2023-07-31 VITALS
HEIGHT: 67 IN | SYSTOLIC BLOOD PRESSURE: 122 MMHG | DIASTOLIC BLOOD PRESSURE: 78 MMHG | HEART RATE: 65 BPM | BODY MASS INDEX: 34.62 KG/M2 | WEIGHT: 220.6 LBS

## 2023-07-31 DIAGNOSIS — O09.513 PRIMIGRAVIDA OF ADVANCED MATERNAL AGE IN THIRD TRIMESTER: ICD-10-CM

## 2023-07-31 DIAGNOSIS — O99.212 OBESITY AFFECTING PREGNANCY IN SECOND TRIMESTER: Primary | ICD-10-CM

## 2023-07-31 DIAGNOSIS — Z3A.32 32 WEEKS GESTATION OF PREGNANCY: ICD-10-CM

## 2023-07-31 PROCEDURE — 76816 OB US FOLLOW-UP PER FETUS: CPT | Performed by: OBSTETRICS & GYNECOLOGY

## 2023-07-31 PROCEDURE — 99212 OFFICE O/P EST SF 10 MIN: CPT | Performed by: OBSTETRICS & GYNECOLOGY

## 2023-07-31 NOTE — PATIENT INSTRUCTIONS
Thank you for choosing us for your  care today. If you have any questions about your ultrasound or care, please do not hesitate to contact us or your primary obstetrician. Some general instructions for your pregnancy are:    Protect against coronavirus: get vaccinated - pregnant women are increased risk of severe COVID. Notify your primary care doctor if you have any symptoms. Exercise: Aim for 22 minutes per day (150 minutes per week) of regular exercise. Walking is great! Nutrition: aim for calcium-rich and iron-rich foods as well as healthy sources of protein. Learn about Preeclampsia: preeclampsia is a common, serious high blood pressure complication in pregnancy. A blood pressure of 531ZYYE (systolic or top number) or 71UCJD (diastolic or bottom number) is not normal and needs evaluation by your doctor. Aspirin is sometimes prescribed in early pregnancy to prevent preeclampsia in women with risk factors - ask your obstetrician if you should be on this medication. If you smoke, try to reduce how many cigarettes you smoke or try to quit completely. Do not vape. Other warning signs to watch out for in pregnancy or postpartum: chest pain, obstructed breathing or shortness of breath, seizures, thoughts of hurting yourself or your baby, bleeding, a painful or swollen leg, fever, or headache (see [facility] POST-BIRTH Warning Signs campaign). If these happen call 911. Itching is also not normal in pregnancy and if you experience this, especially over your hands and feet, potentially worse at night, notify your doctors.

## 2023-07-31 NOTE — TELEPHONE ENCOUNTER
Called pt , per communication consent, LEFT MESSAGE informing pt of delivering  Doctor on-call for her scheduled  SECTION & tubal, 9/15/23 at 0800. Advised to call back with any further questions/concerns.

## 2023-07-31 NOTE — TELEPHONE ENCOUNTER
MrAlejandro and Mrs. Lokesh Parnell stopped by the office today after their visit with MFM. Pt is inquiring about who will be doing her elective C section since Dr. Yovanny Ying is no longer here. Please call to discuss.

## 2023-07-31 NOTE — PROGRESS NOTES
[address]: Ryan Brisneo was seen today for fetal growth assessment ultrasound. See ultrasound report under "OB Procedures" tab. The time spent on this established patient on the encounter date included 3 minutes previsit service time reviewing records and precharting, 5 minutes face-to-face service time counseling regarding results and coordinating care, and  5 minutes charting, totalling 13 minutes.   Please don't hesitate to contact our office with any concerns or questions.  -Shady Mckeon MD

## 2023-08-01 ENCOUNTER — TELEPHONE (OUTPATIENT)
Dept: OBGYN CLINIC | Facility: CLINIC | Age: 33
End: 2023-08-01

## 2023-08-01 NOTE — TELEPHONE ENCOUNTER
Order was placed already at 28 week visit via [address] [place] [address]. This request was initiated as a new request because patient reached out to [facility] today to check status. They advised him they needed a rx. I can not reach out to [facility] as the activity feed in the order is in read only. L/M for call back.

## 2023-08-01 NOTE — TELEPHONE ENCOUNTER
Pt needs a rx for breast pump faxed to:   [phone] or email to: [email or web address]  Phone no: [phone]    Order confirmation #: [id number]

## 2023-08-03 LAB
DME PARACHUTE DELIVERY DATE ACTUAL: NORMAL
DME PARACHUTE DELIVERY DATE REQUESTED: NORMAL
DME PARACHUTE ITEM DESCRIPTION: NORMAL
DME PARACHUTE ORDER STATUS: NORMAL
DME PARACHUTE SUPPLIER NAME: NORMAL
DME PARACHUTE SUPPLIER PHONE: NORMAL

## 2023-08-04 ENCOUNTER — ROUTINE PRENATAL (OUTPATIENT)
Dept: OBGYN CLINIC | Facility: CLINIC | Age: 33
End: 2023-08-04

## 2023-08-04 VITALS
SYSTOLIC BLOOD PRESSURE: 108 MMHG | WEIGHT: 221 LBS | BODY MASS INDEX: 34.69 KG/M2 | HEIGHT: 67 IN | DIASTOLIC BLOOD PRESSURE: 70 MMHG

## 2023-08-04 DIAGNOSIS — Z34.03 ENCOUNTER FOR SUPERVISION OF NORMAL FIRST PREGNANCY IN THIRD TRIMESTER: Primary | ICD-10-CM

## 2023-08-04 DIAGNOSIS — Z3A.33 33 WEEKS GESTATION OF PREGNANCY: ICD-10-CM

## 2023-08-04 LAB
SL AMB  POCT GLUCOSE, UA: NORMAL
SL AMB POCT URINE PROTEIN: NORMAL

## 2023-08-04 NOTE — ASSESSMENT & PLAN NOTE
Roosevelt Palmer is a 26 y.o. Jhonny Sleigh  33w0d who presents for routine PNV. 28 week labs reviewed: nml  Prepregnancy BMI 30.85 with a goal weight gain 5 kg (11 lb)-9 kg (19 lb). TWG 12.2 kg (26 lb 15.9 oz)  Denies OB complaints. Good fetal movement. Denies CTX/LOF/VB. Reviewed normal  assessment and vaccinations at birth. 32 week growth scan showed normal fetal growth. Reviewed  labor precautions and FKCs.    Pregnancy Essential guide and Baby and Me web site recommended

## 2023-08-04 NOTE — PROGRESS NOTES
Problem   33 Weeks Gestation of Pregnancy    Pt is requesting Elective C section     Blood Type: O+  Antibody   Pap Neg/Neg GC/CT -  Neg  PN1 Labs-  Hgb: 12.3/36.9, early Glucola normal   28 Week Labs- nml  COVID vaccine-     Blue folder- reviewed  Yellow folder- has    Genetic screening-   AFP- given and to be done in 4 days  Level 1- appt 3/30  Level 2-      Yellow folder-  TDAP - completed outside facility, scanned to chart   Rhogam- n/a  Delivery consent-   Breast pump - order submitted  Pediatrician - referral placed    Perineal massage -  GBS swab -   IOL -               33 weeks gestation of pregnancy  Ryan Briseno is a 26 y.o. Win Noe  33w0d who presents for routine PNV. 28 week labs reviewed: nml  Prepregnancy BMI 30.85 with a goal weight gain 5 kg (11 lb)-9 kg (19 lb). TWG 12.2 kg (26 lb 15.9 oz)  Denies OB complaints. Good fetal movement. Denies CTX/LOF/VB. Reviewed normal  assessment and vaccinations at birth. 32 week growth scan showed normal fetal growth. Reviewed  labor precautions and FKCs.    Pregnancy Essential guide and Baby and Me web site recommended

## 2023-08-04 NOTE — PATIENT INSTRUCTIONS
Pregnancy at 31 to 34 Weeks   AMBULATORY CARE:   Changes happening with your body: You may continue to have symptoms such as shortness of breath, heartburn, contractions, or swelling of your ankles and feet. You may be gaining about 1 pound a week now. Seek care immediately if:   You develop a severe headache that does not go away. You have new or increased vision changes, such as blurred or spotted vision. You have new or increased swelling in your face or hands. You have vaginal spotting or bleeding. Your water broke or you feel warm water gushing or trickling from your vagina. Call your obstetrician if:   You have more than 5 contractions in 1 hour. You notice any changes in your baby's movements. You have abdominal cramps, pressure, or tightening. You have a change in vaginal discharge. You have chills or a fever. You have vaginal itching, burning, or pain. You have yellow, green, white, or foul-smelling vaginal discharge. You have pain or burning when you urinate, less urine than usual, or pink or bloody urine. You have questions or concerns about your condition or care. How to care for yourself at this stage of your pregnancy:       Eat a variety of healthy foods. Healthy foods include fruits, vegetables, whole-grain breads, low-fat dairy foods, beans, lean meats, and fish. Drink liquids as directed. Ask how much liquid to drink each day and which liquids are best for you. Limit caffeine to less than 200 milligrams each day. Limit your intake of fish to 2 servings each week. Choose fish low in mercury such as canned light tuna, shrimp, salmon, cod, or tilapia. Do not  eat fish high in mercury such as swordfish, tilefish, julio mackerel, and shark. Manage heartburn  by eating 4 or 5 small meals each day instead of large meals. Avoid spicy food. Manage swelling  by lying down and putting your feet up. Take prenatal vitamins as directed.   Your need for certain vitamins and minerals, such as folic acid, increases during pregnancy. Prenatal vitamins provide some of the extra vitamins and minerals you need. Prenatal vitamins may also help to decrease the risk of certain birth defects. Talk to your healthcare provider about exercise. Moderate exercise can help you stay fit. Your healthcare provider will help you plan an exercise program that is safe for you during pregnancy. Do not smoke. Smoking increases your risk of a miscarriage and other health problems during your pregnancy. Smoking can cause your baby to be born too early or weigh less at birth. Ask your healthcare provider for information if you need help quitting. Do not drink alcohol. Alcohol passes from your body to your baby through the placenta. It can affect your baby's brain development and cause fetal alcohol syndrome (FAS). FAS is a group of conditions that causes mental, behavior, and growth problems. Talk to your healthcare provider before you take any medicines. Many medicines may harm your baby if you take them when you are pregnant. Do not take any medicines, vitamins, herbs, or supplements without first talking to your healthcare provider. Never use illegal or street drugs (such as marijuana or cocaine) while you are pregnant. Safety tips during pregnancy:   Avoid hot tubs and saunas. Do not use a hot tub or sauna while you are pregnant, especially during your first trimester. Hot tubs and saunas may raise your baby's temperature and increase the risk of birth defects. Avoid toxoplasmosis. This is an infection caused by eating raw meat or being around infected cat feces. It can cause birth defects, miscarriages, and other problems. Wash your hands after you touch raw meat. Make sure any meat is well-cooked before you eat it. Avoid raw eggs and unpasteurized milk. Use gloves or ask someone else to clean your cat's litter box while you are pregnant. Changes happening with your baby:  By 34 weeks, your baby may weigh more than 5 pounds. Your baby will be about 12 ½ inches long from the top of the head to the rump (baby's bottom). Your baby is gaining about ½ pound a week. Your baby's eyes open and close now. Your baby's kicks and movements are more forceful at this time. What you need to know about prenatal care: Your healthcare provider will check your blood pressure and weight. You may also need the following:  A urine test  may also be done to check for sugar and protein. These can be signs of gestational diabetes or infection. Protein in your urine may also be a sign of preeclampsia. Preeclampsia is a condition that can develop during week 20 or later of your pregnancy. It causes high blood pressure, and it can cause problems with your kidneys and other organs. A gestational diabetes screen  may be done. Your healthcare provider may order either a 1-step or 2-step oral glucose tolerance test (OGTT). 1-step OGTT:  Your blood sugar level will be tested after you have not eaten for 8 hours (fasting). You will then be given a glucose drink. Your level will be tested again 1 hour and 2 hours after you finish the drink. 2-step OGTT:  You do not have to fast for the first part of the test. You will have the glucose drink at any time of day. Your blood sugar level will be checked 1 hour later. If your blood sugar is higher than a certain level, another test will be ordered. You will fast and your blood sugar level will be tested. You will have the glucose drink. Your blood will be tested again 1 hour, 2 hours, and 3 hours after you finish the glucose drink. A Tdap vaccine  may be recommended by your healthcare provider. Fundal height  is a measurement of your uterus to check your baby's growth. This number is usually the same as the number of weeks that you have been pregnant.  Your healthcare provider may also check your baby's position. Your baby's heart rate  will be checked. Follow up with your obstetrician as directed:  Write down your questions so you remember to ask them during your visits. © Copyright Minal Tena 2022 Information is for End User's use only and may not be sold, redistributed or otherwise used for commercial purposes. The above information is an  only. It is not intended as medical advice for individual conditions or treatments. Talk to your doctor, nurse or pharmacist before following any medical regimen to see if it is safe and effective for you.

## 2023-08-04 NOTE — PROGRESS NOTES
POORNIMA: 9/22/23  Blood Type: O+  Labs: UTD   Tdap: completed   Blue Folder: Given and reviewed   Yellow folder: Given and reviewed   Delivery Consent:   Breast pump: completed   Pediatrician Referral: given  Urine Protein neg/ Glucose neg  Denies any loss of fluid, vaginal bleeding or contractions . Good fetal movement. GBS:   Patient does not have any questions or concerns.   C section scheduled 9/15/23

## 2023-08-14 ENCOUNTER — ROUTINE PRENATAL (OUTPATIENT)
Dept: OBGYN CLINIC | Facility: CLINIC | Age: 33
End: 2023-08-14
Payer: COMMERCIAL

## 2023-08-14 VITALS
SYSTOLIC BLOOD PRESSURE: 114 MMHG | WEIGHT: 220.2 LBS | BODY MASS INDEX: 34.56 KG/M2 | HEIGHT: 67 IN | DIASTOLIC BLOOD PRESSURE: 62 MMHG

## 2023-08-14 DIAGNOSIS — Z34.03 ENCOUNTER FOR SUPERVISION OF NORMAL FIRST PREGNANCY IN THIRD TRIMESTER: Primary | ICD-10-CM

## 2023-08-14 DIAGNOSIS — Z3A.34 34 WEEKS GESTATION OF PREGNANCY: ICD-10-CM

## 2023-08-14 DIAGNOSIS — O99.212 OBESITY AFFECTING PREGNANCY IN SECOND TRIMESTER: ICD-10-CM

## 2023-08-14 LAB
SL AMB  POCT GLUCOSE, UA: NORMAL
SL AMB POCT URINE PROTEIN: NORMAL

## 2023-08-14 PROCEDURE — PNV: Performed by: OBSTETRICS & GYNECOLOGY

## 2023-08-14 PROCEDURE — 81002 URINALYSIS NONAUTO W/O SCOPE: CPT | Performed by: OBSTETRICS & GYNECOLOGY

## 2023-08-14 NOTE — PROGRESS NOTES
34 weeks gestation of pregnancy  Patient wants elective csection and bilateral salpingectomy. Has been counseled on prefernce toward vaginal delivery for lower risk of bleeding, infection, VTE, long term effects associated with adhesions. At this time, patient wants to proceed with csection. She does not want ay other children 100%  Reviewed LARC, IUD placement in OR or postpartum  Reviewed. Consent for csection and salpingectomy discussed and signed. Obesity affecting pregnancy in second trimester  TWG 26lbs Patient is exercising regularly and continues to watch her diet. - [facility]        Patient is here for prenatal ob visit today. GA: 34w3d  POORNIMA: 23    Urine: Protein neg / Glucose neg  Denies loss of fluid, vaginal bleeding or contractions. Good fetal movement. Labs utd  Yellow folder given and reviewed   Delivery consent not signed--sign today  Birth plan not yet returned  Breast pump ordered  Peds referral placed  Tdap given at outside facility--scanned under media   scheduled for 9/15/23 with Dr. Rosales Seen    No question's or concerns at this time. The patient was counseled about the labor process. She was counseled regarding potential reasons that she may need a  section including arrest of dilation/descent, non-reassuring fetal status, or worsening maternal status. She was counseled on the risks of  including bleeding, infection, and injury to surrounding structures including the bowel and bladder. She was counseled that there are medical and surgical methods to manage excessive postpartum bleeding. She was counseled that in the event of excessive blood loss, she may require blood transfusion which includes a small risk of blood borne diseases such as hepatitis and HIV. The patient is OK with receiving a blood transfusion if necessary. The patient had an opportunity to ask questions and signed consent.       She was counseled about the possible need for operative delivery using the vacuum or forceps and the indications for doing so. She was counseled that there is a small risk of  complications including intracranial hemorrhage, lacerations, nerve damage or fracture as well as the increased risk for more severe maternal laceration. The patient signed consent.

## 2023-08-15 NOTE — ASSESSMENT & PLAN NOTE
Patient wants elective csection and bilateral salpingectomy. Has been counseled on prefernce toward vaginal delivery for lower risk of bleeding, infection, VTE, long term effects associated with adhesions. At this time, patient wants to proceed with csection. She does not want ay other children 100%  Reviewed LARC, IUD placement in OR or postpartum  Reviewed. Consent for csection and salpingectomy discussed and signed.

## 2023-08-28 PROBLEM — Z3A.36 36 WEEKS GESTATION OF PREGNANCY: Status: ACTIVE | Noted: 2023-03-23

## 2023-08-29 ENCOUNTER — ROUTINE PRENATAL (OUTPATIENT)
Dept: OBGYN CLINIC | Facility: MEDICAL CENTER | Age: 33
End: 2023-08-29

## 2023-08-29 VITALS
HEART RATE: 72 BPM | BODY MASS INDEX: 35.61 KG/M2 | WEIGHT: 224 LBS | SYSTOLIC BLOOD PRESSURE: 122 MMHG | DIASTOLIC BLOOD PRESSURE: 78 MMHG

## 2023-08-29 DIAGNOSIS — Z3A.36 36 WEEKS GESTATION OF PREGNANCY: ICD-10-CM

## 2023-08-29 DIAGNOSIS — R12 HEARTBURN DURING PREGNANCY IN FIRST TRIMESTER: ICD-10-CM

## 2023-08-29 DIAGNOSIS — O99.212 OBESITY AFFECTING PREGNANCY IN SECOND TRIMESTER: ICD-10-CM

## 2023-08-29 DIAGNOSIS — O26.891 HEARTBURN DURING PREGNANCY IN FIRST TRIMESTER: ICD-10-CM

## 2023-08-29 DIAGNOSIS — O09.513 PRIMIGRAVIDA OF ADVANCED MATERNAL AGE IN THIRD TRIMESTER: Primary | ICD-10-CM

## 2023-08-29 LAB
SL AMB  POCT GLUCOSE, UA: NORMAL
SL AMB POCT URINE PROTEIN: NORMAL

## 2023-08-29 PROCEDURE — 87150 DNA/RNA AMPLIFIED PROBE: CPT | Performed by: NURSE PRACTITIONER

## 2023-08-29 NOTE — PROGRESS NOTES
PNV  36w4d    Patient denies any lof, vb or ctx. Patient has +fm. Patient urine is -/-  Patient has no concerns today.

## 2023-08-29 NOTE — ASSESSMENT & PLAN NOTE
TWG # 29  Normal GCT  Limit weight gain to 11-20 lbs. Regular exercise encouraged. Fetal growth scans every 4-6 weeks in 3rd trimester.

## 2023-08-29 NOTE — PATIENT INSTRUCTIONS
Kick Counts in Pregnancy   WHAT YOU NEED TO KNOW:   Kick counts measure how much your baby is moving in your womb. A kick from your baby can be felt as a twist, turn, swish, roll, or jab. It is common to feel your baby kicking at 26 to 28 weeks of pregnancy. You may feel your baby kick as early as 20 weeks of pregnancy. You may want to start counting at 28 weeks. DISCHARGE INSTRUCTIONS:   Contact your doctor immediately if:   You feel a change in the number of kicks or movements of your baby. You feel fewer than 10 kicks within 2 hours. You have questions or concerns about your baby's movements. Why measure kick counts:  Your baby's movement may provide information about your baby's health. He or she may move less, or not at all, if there are problems. Your baby may move less if he or she is not getting enough oxygen or nutrition from the placenta. Do not smoke while you are pregnant. Smoking decreases the amount of oxygen that gets to your baby. Talk to your healthcare provider if you need help to quit smoking. Tell your healthcare provider as soon as you feel a change in your baby's movements. When to measure kick counts:   Measure kick counts at the same time every day. Measure kick counts when your baby is awake and most active. Your baby may be most active in the evening. How to measure kick counts:  Check that your baby is awake before you measure kick counts. You can wake up your baby by lightly pushing on your belly, walking, or drinking something cold. Your healthcare provider may tell you different ways to measure kick counts. You may be told to do the following:  Use a chart or clock to keep track of the time you start and finish counting. Sit in a chair or lie on your left side. Place your hands on the largest part of your belly. Count until you reach 10 kicks. Write down how much time it takes to count 10 kicks. It may take 30 minutes to 2 hours to count 10 kicks. It should not take more than 2 hours to count 10 kicks. Follow up with your doctor as directed:  Write down your questions so you remember to ask them during your visits. © Copyright Emy Mc 2022 Information is for End User's use only and may not be sold, redistributed or otherwise used for commercial purposes. The above information is an  only. It is not intended as medical advice for individual conditions or treatments. Talk to your doctor, nurse or pharmacist before following any medical regimen to see if it is safe and effective for you.

## 2023-08-29 NOTE — PROGRESS NOTES
Problem   Heartburn During Pregnancy in First Trimester   36 Weeks Gestation of Pregnancy    Pt is requesting Elective C section     Blood Type: O+  Antibody   Pap Neg/Neg GC/CT -  Neg  PN1 Labs-  Hgb: 12.3/36.9, early Glucola normal   28 Week Labs- nml  COVID vaccine-     Blue folder- reviewed  Yellow folder- has    Genetic screening-   AFP- given and to be done in 4 days  Level 1- appt 3/30  Level 2- 5/8     Yellow folder-  TDAP - completed outside facility, scanned to chart   Rhogam- n/a  Delivery consent-   Breast pump - order submitted  Pediatrician - referral placed    Perineal massage -N/A  GBS swab -                Obesity Affecting Pregnancy in Second Trimester     36 weeks gestation of pregnancy  Sia Christina is a 26 y.o. Radha Yulissa here for OB visit at 36w4d weeks accompanied by  Enriqueta Whelan. TWG 13.6 kg (29 lb 15.9 oz). No health concerns. Denies LOF, vaginal bleeding with rare contractions (a few every 2 days) . Performing FKC's daily 10 in 2 hours  GBS collected today  TDAP received 7/6/23  C/S and salpingectomy scheduled for 9/15/23. RTO 1 week    Obesity affecting pregnancy in second trimester  TWG # 29  Normal GCT  Limit weight gain to 11-20 lbs. Regular exercise encouraged. Fetal growth scans every 4-6 weeks in 3rd trimester.     Heartburn during pregnancy in first trimester  Resolved

## 2023-08-29 NOTE — ASSESSMENT & PLAN NOTE
Minnette Oppenheim is a 26 y.o.  here for OB visit at 36w4d weeks accompanied by  Ros Webb. TWG 13.6 kg (29 lb 15.9 oz). No health concerns. Denies LOF, vaginal bleeding with rare contractions (a few every 2 days) . Performing FKC's daily 10 in 2 hours  GBS collected today  TDAP received 23  C/S and salpingectomy scheduled for 9/15/23.   RTO 1 week

## 2023-08-30 PROBLEM — Z3A.37 37 WEEKS GESTATION OF PREGNANCY: Status: ACTIVE | Noted: 2023-03-23

## 2023-08-30 NOTE — PATIENT INSTRUCTIONS
Pregnancy at 28 to 38 Weeks   AMBULATORY CARE:   Changes happening with your body: You are considered full term at the beginning of 37 weeks. Your breathing may be easier if your baby has moved down into a head-down position. You may need to urinate more often because the baby may be pressing on your bladder. You may also feel more discomfort and get tired easily. Seek care immediately if:   You develop a severe headache that does not go away. You have new or increased vision changes, such as blurred or spotted vision. You have new or increased swelling in your face or hands. You have vaginal spotting or bleeding. Your water broke or you feel warm water gushing or trickling from your vagina. Call your obstetrician if:   You have more than 5 contractions in 1 hour. You notice any changes in your baby's movements. You have abdominal cramps, pressure, or tightening. You have a change in vaginal discharge. You have chills or a fever. You have vaginal itching, burning, or pain. You have yellow, green, white, or foul-smelling vaginal discharge. You have pain or burning when you urinate, less urine than usual, or pink or bloody urine. You have questions or concerns about your condition or care. How to care for yourself at this stage of your pregnancy:       Eat a variety of healthy foods. Healthy foods include fruits, vegetables, whole-grain breads, low-fat dairy foods, beans, lean meats, and fish. Drink liquids as directed. Ask how much liquid to drink each day and which liquids are best for you. Limit caffeine to less than 200 milligrams each day. Limit your intake of fish to 2 servings each week. Choose fish low in mercury such as canned light tuna, shrimp, salmon, cod, or tilapia. Do not  eat fish high in mercury such as swordfish, tilefish, julio mackerel, and shark. Take prenatal vitamins as directed.   Your need for certain vitamins and minerals, such as folic acid, increases during pregnancy. Prenatal vitamins provide some of the extra vitamins and minerals you need. Prenatal vitamins may also help to decrease the risk of certain birth defects. Rest as needed. Put your feet up if you have swelling in your ankles and feet. Talk to your healthcare provider about exercise. Moderate exercise can help you stay fit. Your healthcare provider will help you plan an exercise program that is safe for you during pregnancy. Do not smoke. Smoking increases your risk of a miscarriage and other health problems during your pregnancy. Smoking can cause your baby to be born early or weigh less at birth. Ask your healthcare provider for information if you need help quitting. Do not drink alcohol. Alcohol passes from your body to your baby through the placenta. It can affect your baby's brain development and cause fetal alcohol syndrome (FAS). FAS is a group of conditions that causes mental, behavior, and growth problems. Talk to your healthcare provider before you take any medicines. Many medicines may harm your baby if you take them when you are pregnant. Do not take any medicines, vitamins, herbs, or supplements without first talking to your healthcare provider. Never use illegal or street drugs (such as marijuana or cocaine) while you are pregnant. Safety tips during pregnancy:   Avoid hot tubs and saunas. Do not use a hot tub or sauna while you are pregnant, especially during your first trimester. Hot tubs and saunas may raise your baby's temperature and increase the risk of birth defects. Avoid toxoplasmosis. This is an infection caused by eating raw meat or being around infected cat feces. It can cause birth defects, miscarriages, and other problems. Wash your hands after you touch raw meat. Make sure any meat is well-cooked before you eat it. Avoid raw eggs and unpasteurized milk.  Use gloves or ask someone else to clean your cat's litter box while you are pregnant. Ask your healthcare provider about travel. The most comfortable time to travel is during the second trimester. Ask your provider if you can travel after 36 weeks. You may not be able to travel in an airplane after 36 weeks. He or she may also recommend you avoid long road trips. Changes happening with your baby:  By 38 weeks, your baby may weigh between 6 and 9 pounds. Your baby may be about 14 inches long from the top of the head to the rump (baby's bottom). Your baby hears well enough to know your voice. As your baby gets larger, you may feel fewer kicks and more stretching and rolling. Your baby may move into a head-down position. Your baby will also rest lower in your abdomen. What you need to know about prenatal care: Your healthcare provider will check your blood pressure and weight. You may also need the following:  A urine test  may also be done to check for sugar and protein. These can be signs of gestational diabetes or infection. Protein in your urine may also be a sign of preeclampsia. Preeclampsia is a condition that can develop during week 20 or later of your pregnancy. It causes high blood pressure, and it can cause problems with your kidneys and other organs. A gestational diabetes screen  may be done. Your healthcare provider may order either a 1-step or 2-step oral glucose tolerance test (OGTT). 1-step OGTT:  Your blood sugar level will be tested after you have not eaten for 8 hours (fasting). You will then be given a glucose drink. Your level will be tested again 1 hour and 2 hours after you finish the drink. 2-step OGTT:  You do not have to fast for the first part of the test. You will have the glucose drink at any time of day. Your blood sugar level will be checked 1 hour later. If your blood sugar is higher than a certain level, another test will be ordered. You will fast and your blood sugar level will be tested. You will have the glucose drink. Your blood will be tested again 1 hour, 2 hours, and 3 hours after you finish the glucose drink. A blood test  may be done to check for anemia (low iron level). A Tdap vaccine  may be recommended by your healthcare provider. A group B strep test  is a test that is done to check for group B strep infection. Group B strep is a type of bacteria that may be found in the vagina or rectum. It can be passed to your baby during delivery if you have it. Your healthcare provider will take swab your vagina or rectum and send the sample to the lab for tests. Fundal height  is a measurement of your uterus to check your baby's growth. This number is usually the same as the number of weeks that you have been pregnant. Your healthcare provider may also check your baby's position. Your baby's heart rate  will be checked. Follow up with your obstetrician as directed:  Write down your questions so you remember to ask them during your visits. © Copyright Ney De La Garza 2022 Information is for End User's use only and may not be sold, redistributed or otherwise used for commercial purposes. The above information is an  only. It is not intended as medical advice for individual conditions or treatments. Talk to your doctor, nurse or pharmacist before following any medical regimen to see if it is safe and effective for you. Kick Counts in Pregnancy   AMBULATORY CARE:   Kick counts  measure how much your baby is moving in your womb. A kick from your baby can be felt as a twist, turn, swish, roll, or jab. It is common to feel your baby kicking at 26 to 28 weeks of pregnancy. You may feel your baby kick as early as 20 weeks of pregnancy. You may want to start counting at 28 weeks. Contact your doctor immediately if:   You feel a change in the number of kicks or movements of your baby. You feel fewer than 10 kicks within 2 hours. You have questions or concerns about your baby's movements.     Why measure kick counts:  Your baby's movement may provide information about your baby's health. He or she may move less, or not at all, if there are problems. Your baby may move less if he or she is not getting enough oxygen or nutrition from the placenta. Do not smoke while you are pregnant. Smoking decreases the amount of oxygen that gets to your baby. Talk to your healthcare provider if you need help to quit smoking. Tell your healthcare provider as soon as you feel a change in your baby's movements. When to measure kick counts:   Measure kick counts at the same time every day. Measure kick counts when your baby is awake and most active. Your baby may be most active in the evening. How to measure kick counts:  Check that your baby is awake before you measure kick counts. You can wake up your baby by lightly pushing on your belly, walking, or drinking something cold. Your healthcare provider may tell you different ways to measure kick counts. You may be told to do the following:  Use a chart or clock to keep track of the time you start and finish counting. Sit in a chair or lie on your left side. Place your hands on the largest part of your belly. Count until you reach 10 kicks. Write down how much time it takes to count 10 kicks. It may take 30 minutes to 2 hours to count 10 kicks. It should not take more than 2 hours to count 10 kicks. Follow up with your doctor as directed:  Write down your questions so you remember to ask them during your visits. © Copyright Nate Lopez 2022 Information is for End User's use only and may not be sold, redistributed or otherwise used for commercial purposes. The above information is an  only. It is not intended as medical advice for individual conditions or treatments. Talk to your doctor, nurse or pharmacist before following any medical regimen to see if it is safe and effective for you.     Perineal Massage    Perineal massage is recommended starting after 34 weeks in order to reduce risks of perineal tearing during childbirth. You have been provided and instructional sheet in your yellow 28 week prenatal packet. Early Labor Signs   AMBULATORY CARE:   Early labor signs and symptoms  are the changes in your body that signal your baby is getting ready to be delivered. Early labor signs can happen weeks, days or hours before delivery. Call 911 for any of the following: You have heavy vaginal bleeding. You cannot get to the hospital before the baby starts to come out. Seek care immediately if:   You have regular, painful contractions that are less than 5 minutes apart and last 30 to 70 seconds each. You have a constant trickle or sudden gush of clear fluid from your vagina. You notice a sudden decrease in your baby's movement. Contact your obstetrician or healthcare provider if:   You have pain in your lower back or abdomen that does not get better when you change positions. You have bloody mucus or show. You have questions or concerns about your condition or care. Early labor signs and symptoms:   Lightening  occurs when your baby drops inside your pelvis. You may feel increased pressure in your pelvis. This may happen a few weeks to a few hours before your labor begins. Contractions  are cramps and tightening that occur in your uterus to help move the baby through your birth canal. Contractions occur regularly and more often each time. Each one lasts about 30 to 70 seconds, and gets stronger until you deliver your baby. Contractions do not go away with movement. The pain usually starts in your lower back and moves to your abdomen. Effacement  occurs when your cervix softens and thins, so it can easily open for the baby. You will not be able to feel effacement. Your healthcare provider will examine your cervix for effacement. Dilation  is widening of your cervix.  Your healthcare provider will examine your cervix for dilation. Your cervix may start to dilate weeks before your baby is delivered. Your cervix will be fully opened and ready for delivery when it is dilated to 10 centimeters. Increased discharge  from your vagina may occur. It may be brown, pink, clear, or slightly bloody. This discharge may also be called bloody show. Bloody show is a mucus plug that forms and blocks your cervix during pregnancy. The discharge may mean that your cervix is opening up and getting ready for delivery. Rupture of membranes  is a sudden release of clear fluid from your vagina. Ruptured membranes means your water broke. Your healthcare provider may need to break your water if it does not happen on its own. False labor: You may have false labor signs, which are also called [place] contractions. False labor is common and may happen several weeks or days before your actual labor. The contractions are not regular, and do not get closer together. The pain is usually mild, does not worsen, and is felt only in front. [place] contractions may happen later in the day, and stop after you change position, walk, or rest.  Follow up with your obstetrician or healthcare provider as directed:  Write down your questions so you remember to ask them during your visit. © Copyright [employer] 2022 Information is for End User's use only and may not be sold, redistributed or otherwise used for commercial purposes. The above information is an  only. It is not intended as medical advice for individual conditions or treatments. Talk to your doctor, nurse or pharmacist before following any medical regimen to see if it is safe and effective for you. Having Your Baby: The Labor Process   AMBULATORY CARE:   The labor process  is a series of 3 stages that your body goes through to deliver your baby. It is not known for sure what causes labor to begin.  Hormones made by you and your baby and changes in your uterus may help labor to start. Labor usually starts 2 weeks before or after your due date. Most women do not have their baby exactly on their due date. Call your obstetrician if:   You have vaginal spotting or bleeding. Your water broke or you feel warm water gushing or trickling from your vagina. You have more than 5 contractions in 1 hour. You have bloody mucus or show. You notice any changes in your baby's movements. You have abdominal cramps, pressure, or tightening. You have a change in vaginal discharge. You have questions or concerns about your condition or care. First stage of labor: The first stage of labor includes latent (early) labor and active labor. This stage may last up to 12 hours if this is your first pregnancy. It may last up to 10 hours if you delivered a baby before. Your uterus will contract to prepare your cervix for delivery and to push your baby out of the birth canal. Your cervix will dilate (widen) and efface (soften and become thinner). Your contractions may last from 30 to 60 seconds. The contractions usually start in the back and move to the front. You may also have a pink, clear, or slightly bloody discharge called bloody show. Bloody show is caused by the movement of a mucus plug from your cervix. During pregnancy the mucus plug blocks your cervix to prevent it from opening  What to do during early labor:  Early labor may last for several hours. You will most likely be at home during early labor. Rest as much as possible while you are at home. Have someone rub your back. It may be helpful to place ice packs on your lower back. Go for a short walk if you are able. Drink water and suck on ice chips. Ask your healthcare provider if it is okay to eat during early labor. How to know when you are in active labor: This stage may last up to 12 hours if this is your first pregnancy. It may last up to 10 hours if you delivered a baby before.  Your contractions will get stronger, last longer, and happen more frequently. They will also become more intense and painful. Time your contractions from the beginning of one to the beginning of the next. Write this information down for 1 hour. Your healthcare provider will tell you when to go to the hospital or birthing center. This will be based on how many minutes apart your contractions are. Second stage of labor:  The second stage is the time between full cervix dilation and the birth of your baby. Your cervix will be completely dilated to 10 centimeters and your baby will be ready to be born. The second stage usually lasts 20 minutes to 2 hours. It may last up to 3 hours if this is your first baby. You may be given antibiotics to fight a bacterial infection you have or prevent an infection during delivery. Healthcare providers will help you find a position for giving birth that is comfortable for you. You can lie on your back, have your feet up in stirrups, or squat. You may feel pressure on your rectum and the urge to push. This pressure is caused by the movement of your baby's head down the birth canal. Your healthcare provider will have you push when you feel the urge. He or she will guide your baby out of the birth canal. Forceps or suction may be used to help deliver your baby. You may also need an episiotomy (incision) to make the vaginal opening larger. This will make more room for your baby. Your perineum will be protected during delivery. This may be with a warm compress or massage of the area. At least 1 minute after your baby is born, your healthcare provider will put clamps on the umbilical cord. The cord will then be cut. Your baby may be placed on your chest right away. He or she may also start breastfeeding. Third stage of labor:  The placenta (afterbirth) is delivered during this stage. After you give birth, your uterus will continue to contract to help push out the placenta.  These contractions will begin 5 to 30 minutes after you give birth. Your healthcare provider will tell you when to push. You may have chills or shakiness during this stage. You may be given medicine to help prevent heavy bleeding that can happen during this stage. How to manage labor pain:  Pain can be managed naturally or with medicines. You can naturally manage pain by using relaxation methods and controlled breathing. There are different types of medicines that can be used to relieve pain while you are in labor. These medicines may be given through an IV or an epidural (thin catheter in your lower back). Talk with your healthcare about your options for pain medicines if you choose to use them. Tell your provider if you prefer not to have any pain control medicines during labor. © Copyright Ney De La Garza 2022 Information is for End User's use only and may not be sold, redistributed or otherwise used for commercial purposes. The above information is an  only. It is not intended as medical advice for individual conditions or treatments. Talk to your doctor, nurse or pharmacist before following any medical regimen to see if it is safe and effective for you.

## 2023-08-30 NOTE — ASSESSMENT & PLAN NOTE
Minnette Oppenheim is a 26 y.o.   37w0d who presents for routine PNV. Already has surgical wash & booklet   Scheduled for C Section 9/15/23  28 week labs reviewed: NML  TWG 14.1 kg (30 lb 15.9 oz)   Denies OB complaints. Good fetal movement. Denies contractions, cramping, leakage of fluid or vaginal bleeding. Tdap vaccine completed  Reviewed  FKCs.    Advised to continue  Perineal massage at 34-36 weeks   Pregnancy Essential guide and Baby and Me web site recommended

## 2023-08-31 PROBLEM — O99.213 OBESITY AFFECTING PREGNANCY IN THIRD TRIMESTER: Status: ACTIVE | Noted: 2023-03-23

## 2023-08-31 PROBLEM — O26.893 HEARTBURN DURING PREGNANCY IN THIRD TRIMESTER: Status: ACTIVE | Noted: 2023-03-23

## 2023-08-31 LAB — GP B STREP DNA SPEC QL NAA+PROBE: NEGATIVE

## 2023-08-31 NOTE — ASSESSMENT & PLAN NOTE
She feels well. She denies LOF/CTX/VB. She did not have any concerns today. We discussed fetal kick counting. Gwendolyn has been given with instructions. Letter provided for her to postpone her immigration appointment.

## 2023-08-31 NOTE — PROGRESS NOTES
36w6d  Pap 2023. Negative HPV Negative   GC/CT:3/27/2023  PN1 Labs: 3/3/2023  Blood Type: O  Positive :   MFM Level 1: 2023  MFM Level 2: 2023  AFP: 3/31/2023  28 Week Labs:2023  TDap:2023  Flu:  GBS: 2023  Blue Folder: given   Yellow Folder: given   Ped Referral : given   Breast pump:  L&D forms:Done   Delivery consent: Done     Patient reports positive fetal movements with no lost of fluids and no contractions at this time . Patient reports that she is doing well so in the pregnancy . Patient is declining a vaginal examination at today's visit . Patient is schedule for a C--Section 9/15/2023.

## 2023-09-01 ENCOUNTER — ROUTINE PRENATAL (OUTPATIENT)
Dept: OBGYN CLINIC | Facility: CLINIC | Age: 33
End: 2023-09-01

## 2023-09-01 VITALS — HEIGHT: 66 IN | WEIGHT: 225 LBS | BODY MASS INDEX: 36.16 KG/M2

## 2023-09-01 DIAGNOSIS — Z34.03 ENCOUNTER FOR SUPERVISION OF NORMAL FIRST PREGNANCY IN THIRD TRIMESTER: Primary | ICD-10-CM

## 2023-09-01 LAB
SL AMB  POCT GLUCOSE, UA: NORMAL
SL AMB POCT URINE PROTEIN: NORMAL

## 2023-09-01 NOTE — PROGRESS NOTES
Problem   37 Weeks Gestation of Pregnancy    Pt is having Elective C section with Tubal on 9/15/23     Blood Type: O+  Antibody   Pap Neg/Neg GC/CT -  Neg  PN1 Labs-  Hgb: 12.3/36.9, early Glucola normal   28 Week Labs- nml  COVID vaccine-     Blue folder- reviewed  Yellow folder- has      AFP- given  Level 1- appt 3/30  Level 2- 5/8     Yellow folder-  TDAP - completed outside facility, scanned to chart   Rhogam- n/a  Delivery consent- signed  Breast pump - order submitted  Pediatrician - referral placed    Perineal massage -N/A  GBS swab - neg                 37 weeks gestation of pregnancy  Araceli Zee is a 26 y.o. Jessika Julio C  37w0d who presents for routine PNV. Already has surgical wash & booklet   Scheduled for C Section 9/15/23  28 week labs reviewed: NML  TWG 14.1 kg (30 lb 15.9 oz)   Denies OB complaints. Good fetal movement. Denies contractions, cramping, leakage of fluid or vaginal bleeding. Tdap vaccine completed  Reviewed  FKCs.    Advised to continue  Perineal massage at 34-36 weeks   Pregnancy Essential guide and Baby and Me web site recommended

## 2023-09-05 ENCOUNTER — TELEPHONE (OUTPATIENT)
Dept: OBGYN CLINIC | Facility: CLINIC | Age: 33
End: 2023-09-05

## 2023-09-05 NOTE — TELEPHONE ENCOUNTER
Patient is scheduled for c/s on 9/15.  She was told not to travel further than an hour or so and she has an appt in [address] [place] [address] 9/14 that would be about 2h away and they will not reschedule without a note saying she is unable to travel due to pregnancy

## 2023-09-06 ENCOUNTER — ROUTINE PRENATAL (OUTPATIENT)
Dept: OBGYN CLINIC | Facility: CLINIC | Age: 33
End: 2023-09-06
Payer: COMMERCIAL

## 2023-09-06 VITALS
DIASTOLIC BLOOD PRESSURE: 78 MMHG | SYSTOLIC BLOOD PRESSURE: 110 MMHG | HEART RATE: 92 BPM | WEIGHT: 223 LBS | BODY MASS INDEX: 35.99 KG/M2

## 2023-09-06 DIAGNOSIS — O26.893 HEARTBURN DURING PREGNANCY IN THIRD TRIMESTER: ICD-10-CM

## 2023-09-06 DIAGNOSIS — Z3A.37 37 WEEKS GESTATION OF PREGNANCY: ICD-10-CM

## 2023-09-06 DIAGNOSIS — O99.213 OBESITY AFFECTING PREGNANCY IN THIRD TRIMESTER: ICD-10-CM

## 2023-09-06 DIAGNOSIS — O09.513 PRIMIGRAVIDA OF ADVANCED MATERNAL AGE IN THIRD TRIMESTER: ICD-10-CM

## 2023-09-06 DIAGNOSIS — Z34.83 ENCOUNTER FOR SUPERVISION OF OTHER NORMAL PREGNANCY IN THIRD TRIMESTER: Primary | ICD-10-CM

## 2023-09-06 DIAGNOSIS — R12 HEARTBURN DURING PREGNANCY IN THIRD TRIMESTER: ICD-10-CM

## 2023-09-06 LAB
SL AMB  POCT GLUCOSE, UA: NORMAL
SL AMB POCT URINE PROTEIN: NORMAL

## 2023-09-06 PROCEDURE — PNV: Performed by: NURSE PRACTITIONER

## 2023-09-06 PROCEDURE — 81002 URINALYSIS NONAUTO W/O SCOPE: CPT | Performed by: NURSE PRACTITIONER

## 2023-09-06 NOTE — PROGRESS NOTES
Problem   37 Weeks Gestation of Pregnancy    Pt is having Elective C section with Tubal on 9/15/23     Blood Type: O+  Antibody   Pap Neg/Neg GC/CT -  Neg  PN1 Labs-  Hgb: 12.3/36.9, early Glucola normal   28 Week Labs- nml  COVID vaccine-     Blue folder- reviewed  Yellow folder- has      AFP- given  Level 1- appt 3/30  Level 2- 5/8     Yellow folder- has  TDAP - completed outside facility, scanned to chart   Rhogam- n/a  Delivery consent- signed  Breast pump - order submitted  Pediatrician - referral placed    Perineal massage -N/A  GBS swab - neg                 37 weeks gestation of pregnancy  She feels well. She denies LOF/CTX/VB. She did not have any concerns today. We discussed fetal kick counting. Gwendolyn has been given with instructions. Letter provided for her to postpone her immigration appointment.

## 2023-09-08 ENCOUNTER — TELEPHONE (OUTPATIENT)
Dept: OBGYN CLINIC | Facility: CLINIC | Age: 33
End: 2023-09-08

## 2023-09-08 NOTE — TELEPHONE ENCOUNTER
pts'  SECTION & tubal date rescheduled to 23 at 1PM, per [employer]UAN[employer] Boss & [facility]. Called pt to inform of date & time. Spoke with pt's  , as pt speaks [place]. Eden Dent Apologized for the inconvenience. Offered to write a letter to his employer if necessary. Advised to call back if there's anything we can do to help with making the change in dates easier for his wife and family. Routed to [facility], on-call, Dr Tonya Gonzalez.

## 2023-09-08 NOTE — TELEPHONE ENCOUNTER
----- Message from Gamal Larsen sent at 9/8/2023  3:33 PM EDT -----  Regarding: FW: Elective CS  Hi Hardeep, looks like you scheduled this back on 7/31st- want to see if she can be moved to someone else's schedule?       ----- Message -----  From: Gil Fonseca MD  Sent: 9/8/2023   3:28 PM EDT  To: Gamal Larsen  Subject: Elective CS                                      This patient was scheduled for an elective primary C/S 1 week from today without asking me. I am covering [facility] that day and also SOD. This should be scheduled on a day when a [facility] OB is covering. Thank you.     Ruma Villatoro

## 2023-09-13 ENCOUNTER — ROUTINE PRENATAL (OUTPATIENT)
Age: 33
End: 2023-09-13

## 2023-09-13 VITALS
DIASTOLIC BLOOD PRESSURE: 78 MMHG | BODY MASS INDEX: 35.83 KG/M2 | HEART RATE: 80 BPM | SYSTOLIC BLOOD PRESSURE: 102 MMHG | WEIGHT: 222 LBS

## 2023-09-13 DIAGNOSIS — Z3A.38 38 WEEKS GESTATION OF PREGNANCY: Primary | ICD-10-CM

## 2023-09-13 PROCEDURE — PNV

## 2023-09-13 NOTE — PROGRESS NOTES
Problem   38 Weeks Gestation of Pregnancy    Pt is having Elective C section with Tubal on 23     Blood Type: O+  Antibody   Pap Neg/Neg GC/CT -  Neg  PN1 Labs-  Hgb: 12.3/36.9, early Glucola normal   28 Week Labs- nml  COVID vaccine-     Blue folder- reviewed  Yellow folder- has      AFP- given  Level 1- appt 3/30  Level 2-      Yellow folder- has  TDAP - completed outside facility, scanned to chart   Rhogam- n/a  Delivery consent- signed  Breast pump - order submitted  Pediatrician - referral placed    Perineal massage -N/A  GBS swab - neg                 38 weeks gestation of pregnancy  Justice Romano is a 26 y.o.   38w5d who presents for routine PNV. 28 week labs reviewed: nml  Prepregnancy BMI 31.33 with a goal weight gain 5 kg (11 lb)-9 kg (19 lb). TWG 12.7 kg (27 lb 15.9 oz)  Denies OB complaints. Good fetal movement. Denies CTX/LOF/VB. CS scheduled  at 1 pm with Dr. Antonette Rosales. Has pre-op wash and surgical pamphlet. Labor precautions and FKCs reviewed.

## 2023-09-13 NOTE — PATIENT INSTRUCTIONS
Pregnancy at 44 to 40 Weeks   AMBULATORY CARE:   Changes happening with your body: You are now getting close to your due date. Your due date is just an estimate of when your baby will be born. Your baby may be born before or after your due date. Your breathing may be easier if your baby has moved down into a head-down position. You may need to urinate more often because the baby may be pressing on your bladder. You may also feel more discomfort and tire easily. You may also be having trouble sleeping. Seek care immediately if:   You develop a severe headache that does not go away. You have new or increased vision changes, such as blurred or spotted vision. You have new or increased swelling in your face or hands. You have vaginal spotting or bleeding. Your water broke or you feel warm water gushing or trickling from your vagina. Call your obstetrician if:   You have more than 5 contractions in 1 hour. You notice any changes in your baby's movements. You have abdominal cramps, pressure, or tightening. You have a change in vaginal discharge. You have chills or a fever. You have vaginal itching, burning, or pain. You have yellow, green, white, or foul-smelling vaginal discharge. You have pain or burning when you urinate, less urine than usual, or pink or bloody urine. You have questions or concerns about your condition or care. How to care for yourself at this stage of your pregnancy:       Eat a variety of healthy foods. Healthy foods include fruits, vegetables, whole-grain breads, low-fat dairy foods, beans, lean meats, and fish. Drink liquids as directed. Ask how much liquid to drink each day and which liquids are best for you. Limit caffeine to less than 200 milligrams each day. Limit your intake of fish to 2 servings each week. Choose fish low in mercury such as canned light tuna, shrimp, salmon, cod, or tilapia.  Do not  eat fish high in mercury such as swordfish, tilefish, julio mackerel, and shark. Take prenatal vitamins as directed. Your need for certain vitamins and minerals, such as folic acid, increases during pregnancy. Prenatal vitamins provide some of the extra vitamins and minerals you need. Prenatal vitamins may also help to decrease the risk of certain birth defects. Rest as needed. Put your feet up if you have swelling in your ankles and feet. Talk to your healthcare provider about exercise. Moderate exercise can help you stay fit. Your healthcare provider will help you plan an exercise program that is safe for you during pregnancy. Do not smoke. Smoking increases your risk of a miscarriage and other health problems during your pregnancy. Smoking can cause your baby to be born early or weigh less at birth. Ask your healthcare provider for information if you need help quitting. Do not drink alcohol. Alcohol passes from your body to your baby through the placenta. It can affect your baby's brain development and cause fetal alcohol syndrome (FAS). FAS is a group of conditions that causes mental, behavior, and growth problems. Talk to your healthcare provider before you take any medicines. Many medicines may harm your baby if you take them when you are pregnant. Do not take any medicines, vitamins, herbs, or supplements without first talking to your healthcare provider. Never use illegal or street drugs (such as marijuana or cocaine) while you are pregnant. Safety tips during pregnancy:   Avoid hot tubs and saunas. Do not use a hot tub or sauna while you are pregnant, especially during your first trimester. Hot tubs and saunas may raise your baby's temperature and increase the risk of birth defects. Avoid toxoplasmosis. This is an infection caused by eating raw meat or being around infected cat feces. It can cause birth defects, miscarriages, and other problems. Wash your hands after you touch raw meat.  Make sure any meat is well-cooked before you eat it. Avoid raw eggs and unpasteurized milk. Use gloves or ask someone else to clean your cat's litter box while you are pregnant. Ask your healthcare provider about travel. The most comfortable time to travel is during the second trimester. Ask your provider if you can travel after 36 weeks. You may not be able to travel in an airplane after 36 weeks. He or she may also recommend that you avoid long road trips. Changes happening with your baby: Your baby is ready to be born. At birth, your baby may weigh about 6 to 9 pounds and be about 19 to 21 inches long. Your baby may be in a head-down position. Your baby will also rest lower in your abdomen. What you need to know about prenatal care: Your healthcare provider will check your blood pressure and weight. You may also need the following:  A urine test  may also be done to check for sugar and protein. These can be signs of gestational diabetes or infection. Protein in your urine may also be a sign of preeclampsia. Preeclampsia is a condition that can develop during week 20 or later of your pregnancy. It causes high blood pressure, and it can cause problems with your kidneys and other organs. A gestational diabetes screen  may be done. Your healthcare provider may order either a 1-step or 2-step oral glucose tolerance test (OGTT). 1-step OGTT:  Your blood sugar level will be tested after you have not eaten for 8 hours (fasting). You will then be given a glucose drink. Your level will be tested again 1 hour and 2 hours after you finish the drink. 2-step OGTT:  You do not have to fast for the first part of the test. You will have the glucose drink at any time of day. Your blood sugar level will be checked 1 hour later. If your blood sugar is higher than a certain level, another test will be ordered. You will fast and your blood sugar level will be tested. You will have the glucose drink.  Your blood will be tested again 1 hour, 2 hours, and 3 hours after you finish the glucose drink. Your baby's heart rate  will be checked. Follow up with your obstetrician as directed:  Write down your questions so you remember to ask them during your visits. © Copyright [employer]  Information is for End User's use only and may not be sold, redistributed or otherwise used for commercial purposes. The above information is an  only. It is not intended as medical advice for individual conditions or treatments. Talk to your doctor, nurse or pharmacist before following any medical regimen to see if it is safe and effective for you.    WHAT YOU NEED TO KNOW:   What do I need to know about a ? A , or  section, is abdominal surgery to deliver your baby. A  may also be done if you are pregnant with more than 1 baby. How do I prepare for a ? Your obstetrician will talk to you about how to prepare if you are having a planned . He or she may tell you not to eat or drink anything after midnight on the day of your surgery. He or she will tell you what medicines to take or not take on the day of your surgery. Tell your healthcare provider if you had an allergic reaction to anesthesia or antibiotics. Antibiotics may be given an hour before surgery or right after it starts. Antibiotics help fight or prevent a bacterial infection. You may need tests for certain infections that can be passed to your baby, such as group B strep (GBS). If you are a GBS carrier or are at increased risk, antibiotics help prevent your baby from being infected during the . What will happen during a ? You will usually be given spinal anesthesia to numb you from the surgery area down. You may still feel pressure or pushing during the , but you should not feel any pain. Your obstetrician will usually make an incision across your lower abdomen.  He or she will gently pull your baby or babies out. Your incision will be closed with stitches or staples and covered with a bandage. What will happen after a ? You will be taken to a room to rest for about an hour after you deliver. Do not get out of bed until your healthcare provider says it is okay. Call for a healthcare provider if you are holding your baby and start to feel tired. The provider can put him or her in a bassinet near you while you rest or sleep. This will help prevent an accidental drop or fall of your baby. What are the risks of a ? You may bleed more than expected or develop an infection. Your bladder or intestines may be injured during the procedure. You may get a blood clot in your leg. This may become life-threatening. CARE AGREEMENT:   You have the right to help plan your care. Learn about your health condition and how it may be treated. Discuss treatment options with your healthcare providers to decide what care you want to receive. You always have the right to refuse treatment. The above information is an  only. It is not intended as medical advice for individual conditions or treatments. Talk to your doctor, nurse or pharmacist before following any medical regimen to see if it is safe and effective for you. ©  [facility]  Information is for End User's use only and may not be sold, redistributed or otherwise used for commercial purposes.

## 2023-09-13 NOTE — PROGRESS NOTES
PNV  38w5d    Patient denies any lof, vb or ctx. Patient has +fm. Patient urine is -/-  Pt scheduled  at 1pm for LTCS  Patient has no concerns today.

## 2023-09-13 NOTE — ASSESSMENT & PLAN NOTE
Danette Amezquita is a 26 y.o.   38w5d who presents for routine PNV. 28 week labs reviewed: nml  Prepregnancy BMI 31.33 with a goal weight gain 5 kg (11 lb)-9 kg (19 lb). TWG 12.7 kg (27 lb 15.9 oz)  Denies OB complaints. Good fetal movement. Denies CTX/LOF/VB. CS scheduled  at 1 pm with Dr. Morales Ta. Has pre-op wash and surgical pamphlet. Labor precautions and FKCs reviewed.

## 2023-09-15 NOTE — ASSESSMENT & PLAN NOTE
Shalini Lovelace is a 26 y.o. Selfridge Palm  39w0d who presents for routine PNV. C section tomorrow 11 am   Patient mentions a cold sore starting in the corner of her mouth. She has never had 1 before, a seek Lavere ointment ordered patient instructed to apply every 4 hours  28 week labs reviewed:   TWG 14.1 kg (30 lb 15.9 oz)   Denies OB complaints. Good fetal movement. Denies contractions, cramping, leakage of fluid or vaginal bleeding. Tdap vaccine completed  Reviewed FKCs.    Pregnancy Essential guide and Baby and Me web site recommended

## 2023-09-15 NOTE — PROGRESS NOTES
39w5d  Pap 2023. Negative HPV Negative   GC/CT:3/27/2023 Neg / neg   PN1 Labs: 3/3/2023  Blood Type: O  Positive :   MFM Level 1: 2023  MFM Level 2: 2023  AFP:  3/31/2023  28 Week Labs: 2023 Normal @ 67   TDap: 2023  Flu:  GBS: 2023 Negative   Blue Folder: given   Yellow Folder: given   Ped Referral : given   Breast pump:Recieved   L&D forms: Done   Delivery consent: Done     Patient is schedule for C- sectio on 2023 @ 1:00 pm also she is having a Tubal ligation . Patient reports positive fetal movements with no lost of fluids she has some contractions on Monday but they went away .

## 2023-09-15 NOTE — PATIENT INSTRUCTIONS
AMBULATORY CARE:   What you need to know about a :  A , or  section, is abdominal surgery to deliver your baby. A  may also be done if you are pregnant with more than 1 baby. How to prepare for a :   Your obstetrician will talk to you about how to prepare if you are having a planned . He or she may tell you not to eat or drink anything after midnight on the day of your surgery. He or she will tell you what medicines to take or not take on the day of your surgery. Tell your healthcare provider if you had an allergic reaction to anesthesia or antibiotics. Antibiotics may be given an hour before surgery or right after it starts. Antibiotics help fight or prevent a bacterial infection. You may need tests for certain infections that can be passed to your baby, such as group B strep (GBS). If you are a GBS carrier or at increased risk, antibiotics help prevent your baby from being infected during the . What will happen during a :  You will usually be given spinal anesthesia to numb you from the surgery area down. You may still feel pressure or pushing during the , but you should not feel any pain. Your obstetrician will usually make an incision across your lower abdomen. He or she will gently pull your baby or babies out. Your incision will be closed with stitches or staples and covered with a bandage. What will happen after a :   You will be taken to a room to rest for about an hour after you deliver. Do not get out of bed until healthcare providers say it is okay. Call for a healthcare provider if you are holding your baby and start to feel tired. The provider can put him or her in a bassinet near you while you rest or sleep. This will help prevent an accidental drop or fall of your baby. Risks of a :  You may bleed more than expected or develop an infection.  Your bladder or intestines may be injured during the procedure. You may get a blood clot in your leg. This may become life-threatening. Call your local emergency number (911 in the [address]) if:   You feel lightheaded, short of breath, and have chest pain. You cough up blood. Call your obstetrician if:   Blood soaks through your bandage. Your stitches come apart. Your arm or leg feels warm, tender, and painful. It may look swollen and red. You have heavy vaginal bleeding that fills 1 or more sanitary pads in 1 hour. You have a fever. Your incision is swollen, red, or draining pus. You have questions or concerns about yourself or your baby. Medicines: You may  need any of the following:  Prescription pain medicine  may be given. Ask your healthcare provider how to take this medicine safely. Some prescription pain medicines contain acetaminophen. Do not take other medicines that contain acetaminophen without talking to your healthcare provider. Too much acetaminophen may cause liver damage. Prescription pain medicine may cause constipation. Ask your healthcare provider how to prevent or treat constipation. Acetaminophen  decreases pain and fever. It is available without a doctor's order. Ask how much to take and how often to take it. Follow directions. Read the labels of all other medicines you are using to see if they also contain acetaminophen, or ask your doctor or pharmacist. Acetaminophen can cause liver damage if not taken correctly. NSAIDs , such as ibuprofen, help decrease swelling, pain, and fever. NSAIDs can cause stomach bleeding or kidney problems in certain people. If you take blood thinner medicine, always ask your healthcare provider if NSAIDs are safe for you. Always read the medicine label and follow directions. Wound care:  Carefully wash your incision wound with soap and water every day. Keep your wound clean and dry. Wear loose, comfortable clothes that do not rub against your wound.  Ask about bathing and showering. Limit activity as directed: Ask when it is safe for you to drive, walk up stairs, lift heavy objects, and have sex. Ask when it is okay to exercise, and what types of exercise to do. Start slowly and do more as you get stronger. Drink liquids as directed:  Liquids help keep you hydrated after your procedure and decrease your risk for a blood clot. Ask how much liquid to drink each day and which liquids are best for you. Follow up with your obstetrician as directed: You may need to return to have your stitches or staples removed. Write down your questions so you remember to ask them during your visits. © Copyright [employer] 2022 Information is for End User's use only and may not be sold, redistributed or otherwise used for commercial purposes. The above information is an  only. It is not intended as medical advice for individual conditions or treatments. Talk to your doctor, nurse or pharmacist before following any medical regimen to see if it is safe and effective for you.

## 2023-09-19 NOTE — PROGRESS NOTES
Phone call to patient for pre-operative instructions prior to . Message left and asked patient to call back to confirm they received the message    Pt was instructed to arrive 2 hours before her scheduled OR time at 1100    Pt instructed to remain NPO after midnight. *This includes gum, water and hard candy  *If patient takes AM meds (e.g.hypertensive meds) they may take these meds with a sip of water. *This should not include medications like prenatal vitamins Aspirin or colace. *Type 1 diabetics should stay on their normal insulin regime or as ordered by their doctor. Type 2 on insulin should take 1/2 dose of their overnight insulin or as instructed by their doctor  Pt should brush their teeth as usual.    Pt was instructed to buy and use a pre-surgical wash containing chlorhexidine the night before and the morning of her scheduled  and to wear clean clothing after the wash. Pt instructed not to shave operative area prior to surgery. Pt was asked not to wear any jewelry and to leave all of her valuables at home. Pt was asked to leave all of her larger bags and suitcases in the car to be brought in after she is assigned a post partum room. Pt was informed that she may have 1 support person in the OR/PACU area and of the current visiting policies.

## 2023-09-20 ENCOUNTER — ROUTINE PRENATAL (OUTPATIENT)
Dept: OBGYN CLINIC | Facility: CLINIC | Age: 33
End: 2023-09-20
Payer: COMMERCIAL

## 2023-09-20 VITALS
HEIGHT: 66 IN | DIASTOLIC BLOOD PRESSURE: 76 MMHG | WEIGHT: 225 LBS | SYSTOLIC BLOOD PRESSURE: 122 MMHG | BODY MASS INDEX: 36.16 KG/M2

## 2023-09-20 DIAGNOSIS — Z34.03 ENCOUNTER FOR SUPERVISION OF NORMAL FIRST PREGNANCY IN THIRD TRIMESTER: Primary | ICD-10-CM

## 2023-09-20 DIAGNOSIS — B00.1 COLD SORE: ICD-10-CM

## 2023-09-20 PROBLEM — Z3A.39 39 WEEKS GESTATION OF PREGNANCY: Status: ACTIVE | Noted: 2023-03-23

## 2023-09-20 PROBLEM — Z30.2 STERILIZATION: Status: ACTIVE | Noted: 2023-09-20

## 2023-09-20 LAB
SL AMB  POCT GLUCOSE, UA: NORMAL
SL AMB POCT URINE PROTEIN: NORMAL

## 2023-09-20 PROCEDURE — PNV: Performed by: OBSTETRICS & GYNECOLOGY

## 2023-09-20 PROCEDURE — 81002 URINALYSIS NONAUTO W/O SCOPE: CPT | Performed by: OBSTETRICS & GYNECOLOGY

## 2023-09-20 PROCEDURE — NC001 PR NO CHARGE: Performed by: STUDENT IN AN ORGANIZED HEALTH CARE EDUCATION/TRAINING PROGRAM

## 2023-09-20 RX ORDER — ACYCLOVIR 50 MG/G
OINTMENT TOPICAL EVERY 4 HOURS
Qty: 15 G | Refills: 0 | Status: SHIPPED | OUTPATIENT
Start: 2023-09-20

## 2023-09-20 NOTE — PROGRESS NOTES
Problem   39 Weeks Gestation of Pregnancy    Pt is having Elective C section with Tubal on 9/21/23     Blood Type: O+  Antibody   Pap Neg/Neg GC/CT -  Neg  PN1 Labs-  Hgb: 12.3/36.9, early Glucola normal   28 Week Labs- nml  COVID vaccine-     Blue folder- reviewed  Yellow folder- has      AFP- given  Level 1- appt 3/30  Level 2- 5/8     Yellow folder- has  TDAP - completed outside facility, scanned to chart   Rhogam- n/a  Delivery consent- signed  Breast pump - order submitted  Pediatrician - referral placed    Perineal massage -N/A  GBS swab - neg                 39 weeks gestation of pregnancy  Bakari Whaley is a 26 y.o. Gary Costello  39w0d who presents for routine PNV. C section tomorrow 11 am   Patient mentions a cold sore starting in the corner of her mouth. She has never had 1 before, a seek Lavere ointment ordered patient instructed to apply every 4 hours  28 week labs reviewed:   TWG 14.1 kg (30 lb 15.9 oz)   Denies OB complaints. Good fetal movement. Denies contractions, cramping, leakage of fluid or vaginal bleeding. Tdap vaccine completed  Reviewed FKCs.    Pregnancy Essential guide and Baby and Me web site recommended

## 2023-09-21 ENCOUNTER — HOSPITAL ENCOUNTER (INPATIENT)
Facility: HOSPITAL | Age: 33
LOS: 2 days | Discharge: HOME | End: 2023-09-23
Attending: STUDENT IN AN ORGANIZED HEALTH CARE EDUCATION/TRAINING PROGRAM | Admitting: STUDENT IN AN ORGANIZED HEALTH CARE EDUCATION/TRAINING PROGRAM
Payer: COMMERCIAL

## 2023-09-21 ENCOUNTER — ANESTHESIA (INPATIENT)
Dept: LABOR AND DELIVERY | Facility: HOSPITAL | Age: 33
End: 2023-09-21
Payer: COMMERCIAL

## 2023-09-21 ENCOUNTER — ANESTHESIA EVENT (INPATIENT)
Dept: LABOR AND DELIVERY | Facility: HOSPITAL | Age: 33
End: 2023-09-21
Payer: COMMERCIAL

## 2023-09-21 DIAGNOSIS — J45.909 UNCOMPLICATED ASTHMA, UNSPECIFIED ASTHMA SEVERITY, UNSPECIFIED WHETHER PERSISTENT: ICD-10-CM

## 2023-09-21 DIAGNOSIS — Z41.9 ELECTIVE SURGICAL PROCEDURE: ICD-10-CM

## 2023-09-21 DIAGNOSIS — Z41.9 PATIENT-REQUESTED PROCEDURE: ICD-10-CM

## 2023-09-21 DIAGNOSIS — Z30.2 REQUEST FOR STERILIZATION: ICD-10-CM

## 2023-09-21 DIAGNOSIS — Z3A.38 38 WEEKS GESTATION OF PREGNANCY: Primary | ICD-10-CM

## 2023-09-21 PROBLEM — R03.0 ELEVATED BLOOD-PRESSURE READING, WITHOUT DIAGNOSIS OF HYPERTENSION: Status: ACTIVE | Noted: 2023-09-21

## 2023-09-21 LAB
ABO GROUP BLD: NORMAL
ALBUMIN SERPL BCP-MCNC: 4 G/DL (ref 3.5–5)
ALP SERPL-CCNC: 84 U/L (ref 34–104)
ALT SERPL W P-5'-P-CCNC: 21 U/L (ref 7–52)
ANION GAP SERPL CALCULATED.3IONS-SCNC: 9 MMOL/L
AST SERPL W P-5'-P-CCNC: 23 U/L (ref 13–39)
BASE EXCESS BLDCOV CALC-SCNC: 0.5 MMOL/L (ref 1–9)
BASOPHILS # BLD AUTO: 0.03 THOUSANDS/ÂΜL (ref 0–0.1)
BASOPHILS NFR BLD AUTO: 0 % (ref 0–1)
BILIRUB SERPL-MCNC: 0.47 MG/DL (ref 0.2–1)
BLD GP AB SCN SERPL QL: NEGATIVE
BUN SERPL-MCNC: 12 MG/DL (ref 5–25)
CALCIUM SERPL-MCNC: 9.6 MG/DL (ref 8.4–10.2)
CHLORIDE SERPL-SCNC: 103 MMOL/L (ref 96–108)
CO2 SERPL-SCNC: 23 MMOL/L (ref 21–32)
CREAT SERPL-MCNC: 0.83 MG/DL (ref 0.6–1.3)
CREAT UR-MCNC: 53.1 MG/DL
EOSINOPHIL # BLD AUTO: 0.05 THOUSAND/ÂΜL (ref 0–0.61)
EOSINOPHIL NFR BLD AUTO: 1 % (ref 0–6)
ERYTHROCYTE [DISTWIDTH] IN BLOOD BY AUTOMATED COUNT: 12.5 % (ref 11.6–15.1)
GFR SERPL CREATININE-BSD FRML MDRD: 91 ML/MIN/1.73SQ M
GLUCOSE SERPL-MCNC: 84 MG/DL (ref 65–140)
HCO3 BLDCOV-SCNC: 27.4 MMOL/L (ref 12.2–28.6)
HCT VFR BLD AUTO: 39.4 % (ref 34.8–46.1)
HGB BLD-MCNC: 13.2 G/DL (ref 11.5–15.4)
HOLD SPECIMEN: NORMAL
IMM GRANULOCYTES # BLD AUTO: 0.05 THOUSAND/UL (ref 0–0.2)
IMM GRANULOCYTES NFR BLD AUTO: 1 % (ref 0–2)
LYMPHOCYTES # BLD AUTO: 1.86 THOUSANDS/ÂΜL (ref 0.6–4.47)
LYMPHOCYTES NFR BLD AUTO: 17 % (ref 14–44)
MCH RBC QN AUTO: 31.6 PG (ref 26.8–34.3)
MCHC RBC AUTO-ENTMCNC: 33.5 G/DL (ref 31.4–37.4)
MCV RBC AUTO: 94 FL (ref 82–98)
MONOCYTES # BLD AUTO: 0.67 THOUSAND/ÂΜL (ref 0.17–1.22)
MONOCYTES NFR BLD AUTO: 6 % (ref 4–12)
NEUTROPHILS # BLD AUTO: 8.32 THOUSANDS/ÂΜL (ref 1.85–7.62)
NEUTS SEG NFR BLD AUTO: 75 % (ref 43–75)
NRBC BLD AUTO-RTO: 0 /100 WBCS
OXYHGB MFR BLDCOV: 27.5 %
PCO2 BLDCOV: 52.3 MM HG (ref 27–43)
PH BLDCOV: 7.34 [PH] (ref 7.19–7.49)
PLATELET # BLD AUTO: 187 THOUSANDS/UL (ref 149–390)
PMV BLD AUTO: 10 FL (ref 8.9–12.7)
PO2 BLDCOV: 14.5 MM HG (ref 15–45)
POTASSIUM SERPL-SCNC: 4.2 MMOL/L (ref 3.5–5.3)
PROT SERPL-MCNC: 7.3 G/DL (ref 6.4–8.4)
PROT UR-MCNC: 41 MG/DL
PROT/CREAT UR: 0.77 MG/G{CREAT} (ref 0–0.1)
RBC # BLD AUTO: 4.18 MILLION/UL (ref 3.81–5.12)
RH BLD: POSITIVE
SAO2 % BLDCOV: 6.4 ML/DL
SODIUM SERPL-SCNC: 135 MMOL/L (ref 135–147)
SPECIMEN EXPIRATION DATE: NORMAL
TREPONEMA PALLIDUM IGG+IGM AB [PRESENCE] IN SERUM OR PLASMA BY IMMUNOASSAY: NORMAL
WBC # BLD AUTO: 10.98 THOUSAND/UL (ref 4.31–10.16)

## 2023-09-21 PROCEDURE — 86900 BLOOD TYPING SEROLOGIC ABO: CPT

## 2023-09-21 PROCEDURE — 82570 ASSAY OF URINE CREATININE: CPT

## 2023-09-21 PROCEDURE — 85025 COMPLETE CBC W/AUTO DIFF WBC: CPT

## 2023-09-21 PROCEDURE — 80053 COMPREHEN METABOLIC PANEL: CPT

## 2023-09-21 PROCEDURE — 86780 TREPONEMA PALLIDUM: CPT

## 2023-09-21 PROCEDURE — 59510 CESAREAN DELIVERY: CPT | Performed by: STUDENT IN AN ORGANIZED HEALTH CARE EDUCATION/TRAINING PROGRAM

## 2023-09-21 PROCEDURE — 84156 ASSAY OF PROTEIN URINE: CPT

## 2023-09-21 PROCEDURE — 82805 BLOOD GASES W/O2 SATURATION: CPT | Performed by: STUDENT IN AN ORGANIZED HEALTH CARE EDUCATION/TRAINING PROGRAM

## 2023-09-21 PROCEDURE — 4A1HXCZ MONITORING OF PRODUCTS OF CONCEPTION, CARDIAC RATE, EXTERNAL APPROACH: ICD-10-PCS | Performed by: STUDENT IN AN ORGANIZED HEALTH CARE EDUCATION/TRAINING PROGRAM

## 2023-09-21 PROCEDURE — 86850 RBC ANTIBODY SCREEN: CPT

## 2023-09-21 PROCEDURE — 86901 BLOOD TYPING SEROLOGIC RH(D): CPT

## 2023-09-21 RX ORDER — ONDANSETRON 2 MG/ML
4 INJECTION INTRAMUSCULAR; INTRAVENOUS EVERY 8 HOURS PRN
Status: DISCONTINUED | OUTPATIENT
Start: 2023-09-21 | End: 2023-09-21

## 2023-09-21 RX ORDER — IBUPROFEN 600 MG/1
600 TABLET ORAL EVERY 6 HOURS
Status: DISCONTINUED | OUTPATIENT
Start: 2023-09-23 | End: 2023-09-23 | Stop reason: HOSPADM

## 2023-09-21 RX ORDER — KETOROLAC TROMETHAMINE 30 MG/ML
30 INJECTION, SOLUTION INTRAMUSCULAR; INTRAVENOUS EVERY 6 HOURS SCHEDULED
Status: DISPENSED | OUTPATIENT
Start: 2023-09-21 | End: 2023-09-23

## 2023-09-21 RX ORDER — ALBUTEROL SULFATE 2.5 MG/3ML
2.5 SOLUTION RESPIRATORY (INHALATION) ONCE AS NEEDED
Status: DISCONTINUED | OUTPATIENT
Start: 2023-09-21 | End: 2023-09-23 | Stop reason: HOSPADM

## 2023-09-21 RX ORDER — SCOLOPAMINE TRANSDERMAL SYSTEM 1 MG/1
1 PATCH, EXTENDED RELEASE TRANSDERMAL
Status: DISCONTINUED | OUTPATIENT
Start: 2023-09-21 | End: 2023-09-23 | Stop reason: HOSPADM

## 2023-09-21 RX ORDER — BUPIVACAINE HYDROCHLORIDE 7.5 MG/ML
INJECTION, SOLUTION INTRASPINAL AS NEEDED
Status: DISCONTINUED | OUTPATIENT
Start: 2023-09-21 | End: 2023-09-21

## 2023-09-21 RX ORDER — OXYTOCIN/RINGER'S LACTATE 30/500 ML
62.5 PLASTIC BAG, INJECTION (ML) INTRAVENOUS ONCE
Status: COMPLETED | OUTPATIENT
Start: 2023-09-21 | End: 2023-09-22

## 2023-09-21 RX ORDER — FENTANYL CITRATE/PF 50 MCG/ML
25 SYRINGE (ML) INJECTION
Status: DISCONTINUED | OUTPATIENT
Start: 2023-09-21 | End: 2023-09-23 | Stop reason: HOSPADM

## 2023-09-21 RX ORDER — CALCIUM CARBONATE 500 MG/1
1000 TABLET, CHEWABLE ORAL DAILY PRN
Status: DISCONTINUED | OUTPATIENT
Start: 2023-09-21 | End: 2023-09-23 | Stop reason: HOSPADM

## 2023-09-21 RX ORDER — DOCUSATE SODIUM 100 MG/1
100 CAPSULE, LIQUID FILLED ORAL 2 TIMES DAILY
Status: DISCONTINUED | OUTPATIENT
Start: 2023-09-21 | End: 2023-09-23 | Stop reason: HOSPADM

## 2023-09-21 RX ORDER — HYDROMORPHONE HCL/PF 1 MG/ML
0.5 SYRINGE (ML) INJECTION EVERY 2 HOUR PRN
Status: DISCONTINUED | OUTPATIENT
Start: 2023-09-21 | End: 2023-09-23 | Stop reason: HOSPADM

## 2023-09-21 RX ORDER — NALBUPHINE HYDROCHLORIDE 10 MG/ML
5 INJECTION, SOLUTION INTRAMUSCULAR; INTRAVENOUS; SUBCUTANEOUS
Status: DISPENSED | OUTPATIENT
Start: 2023-09-21 | End: 2023-09-22

## 2023-09-21 RX ORDER — ONDANSETRON 2 MG/ML
4 INJECTION INTRAMUSCULAR; INTRAVENOUS EVERY 8 HOURS PRN
Status: DISCONTINUED | OUTPATIENT
Start: 2023-09-22 | End: 2023-09-21

## 2023-09-21 RX ORDER — KETOROLAC TROMETHAMINE 30 MG/ML
30 INJECTION, SOLUTION INTRAMUSCULAR; INTRAVENOUS EVERY 6 HOURS
Status: DISPENSED | OUTPATIENT
Start: 2023-09-21 | End: 2023-09-22

## 2023-09-21 RX ORDER — DIAPER,BRIEF,INFANT-TODD,DISP
1 EACH MISCELLANEOUS DAILY PRN
Status: DISCONTINUED | OUTPATIENT
Start: 2023-09-21 | End: 2023-09-23 | Stop reason: HOSPADM

## 2023-09-21 RX ORDER — METOCLOPRAMIDE HYDROCHLORIDE 5 MG/ML
10 INJECTION INTRAMUSCULAR; INTRAVENOUS ONCE AS NEEDED
Status: DISCONTINUED | OUTPATIENT
Start: 2023-09-21 | End: 2023-09-23 | Stop reason: HOSPADM

## 2023-09-21 RX ORDER — ONDANSETRON 2 MG/ML
INJECTION INTRAMUSCULAR; INTRAVENOUS AS NEEDED
Status: DISCONTINUED | OUTPATIENT
Start: 2023-09-21 | End: 2023-09-21

## 2023-09-21 RX ORDER — ONDANSETRON 2 MG/ML
4 INJECTION INTRAMUSCULAR; INTRAVENOUS EVERY 8 HOURS PRN
Status: DISCONTINUED | OUTPATIENT
Start: 2023-09-22 | End: 2023-09-23 | Stop reason: HOSPADM

## 2023-09-21 RX ORDER — FENTANYL CITRATE 50 UG/ML
INJECTION, SOLUTION INTRAMUSCULAR; INTRAVENOUS AS NEEDED
Status: DISCONTINUED | OUTPATIENT
Start: 2023-09-21 | End: 2023-09-21

## 2023-09-21 RX ORDER — HYDROMORPHONE HCL/PF 1 MG/ML
0.5 SYRINGE (ML) INJECTION
Status: DISCONTINUED | OUTPATIENT
Start: 2023-09-21 | End: 2023-09-23 | Stop reason: HOSPADM

## 2023-09-21 RX ORDER — SODIUM CHLORIDE, SODIUM LACTATE, POTASSIUM CHLORIDE, CALCIUM CHLORIDE 600; 310; 30; 20 MG/100ML; MG/100ML; MG/100ML; MG/100ML
125 INJECTION, SOLUTION INTRAVENOUS CONTINUOUS
Status: DISCONTINUED | OUTPATIENT
Start: 2023-09-21 | End: 2023-09-21

## 2023-09-21 RX ORDER — LABETALOL HYDROCHLORIDE 5 MG/ML
10 INJECTION, SOLUTION INTRAVENOUS
Status: DISCONTINUED | OUTPATIENT
Start: 2023-09-21 | End: 2023-09-23 | Stop reason: HOSPADM

## 2023-09-21 RX ORDER — SODIUM CHLORIDE, SODIUM LACTATE, POTASSIUM CHLORIDE, CALCIUM CHLORIDE 600; 310; 30; 20 MG/100ML; MG/100ML; MG/100ML; MG/100ML
125 INJECTION, SOLUTION INTRAVENOUS CONTINUOUS
Status: DISCONTINUED | OUTPATIENT
Start: 2023-09-21 | End: 2023-09-23 | Stop reason: HOSPADM

## 2023-09-21 RX ORDER — ONDANSETRON 2 MG/ML
4 INJECTION INTRAMUSCULAR; INTRAVENOUS ONCE AS NEEDED
Status: DISCONTINUED | OUTPATIENT
Start: 2023-09-21 | End: 2023-09-21

## 2023-09-21 RX ORDER — CEFAZOLIN SODIUM 2 G/50ML
2000 SOLUTION INTRAVENOUS ONCE
Status: COMPLETED | OUTPATIENT
Start: 2023-09-21 | End: 2023-09-21

## 2023-09-21 RX ORDER — OXYCODONE HYDROCHLORIDE 5 MG/1
5 TABLET ORAL EVERY 4 HOURS PRN
Status: DISCONTINUED | OUTPATIENT
Start: 2023-09-22 | End: 2023-09-23 | Stop reason: HOSPADM

## 2023-09-21 RX ORDER — METOCLOPRAMIDE HYDROCHLORIDE 5 MG/ML
INJECTION INTRAMUSCULAR; INTRAVENOUS AS NEEDED
Status: DISCONTINUED | OUTPATIENT
Start: 2023-09-21 | End: 2023-09-21

## 2023-09-21 RX ORDER — KETOROLAC TROMETHAMINE 30 MG/ML
INJECTION, SOLUTION INTRAMUSCULAR; INTRAVENOUS AS NEEDED
Status: DISCONTINUED | OUTPATIENT
Start: 2023-09-21 | End: 2023-09-21

## 2023-09-21 RX ORDER — PROMETHAZINE HYDROCHLORIDE 25 MG/ML
12.5 INJECTION, SOLUTION INTRAMUSCULAR; INTRAVENOUS ONCE AS NEEDED
Status: DISCONTINUED | OUTPATIENT
Start: 2023-09-21 | End: 2023-09-23 | Stop reason: HOSPADM

## 2023-09-21 RX ORDER — ONDANSETRON 2 MG/ML
4 INJECTION INTRAMUSCULAR; INTRAVENOUS EVERY 6 HOURS PRN
Status: ACTIVE | OUTPATIENT
Start: 2023-09-21 | End: 2023-09-22

## 2023-09-21 RX ORDER — METOCLOPRAMIDE HYDROCHLORIDE 5 MG/ML
5 INJECTION INTRAMUSCULAR; INTRAVENOUS EVERY 6 HOURS PRN
Status: ACTIVE | OUTPATIENT
Start: 2023-09-21 | End: 2023-09-22

## 2023-09-21 RX ORDER — OXYCODONE HYDROCHLORIDE 5 MG/1
5 TABLET ORAL EVERY 4 HOURS PRN
Status: DISCONTINUED | OUTPATIENT
Start: 2023-09-21 | End: 2023-09-21

## 2023-09-21 RX ORDER — NALOXONE HYDROCHLORIDE 0.4 MG/ML
0.1 INJECTION, SOLUTION INTRAMUSCULAR; INTRAVENOUS; SUBCUTANEOUS
Status: ACTIVE | OUTPATIENT
Start: 2023-09-21 | End: 2023-09-22

## 2023-09-21 RX ORDER — OXYCODONE HYDROCHLORIDE 10 MG/1
10 TABLET ORAL EVERY 4 HOURS PRN
Status: DISCONTINUED | OUTPATIENT
Start: 2023-09-22 | End: 2023-09-23 | Stop reason: HOSPADM

## 2023-09-21 RX ORDER — HYDROMORPHONE HCL/PF 1 MG/ML
0.2 SYRINGE (ML) INJECTION
Status: DISCONTINUED | OUTPATIENT
Start: 2023-09-21 | End: 2023-09-23 | Stop reason: HOSPADM

## 2023-09-21 RX ORDER — OXYCODONE HYDROCHLORIDE 10 MG/1
10 TABLET ORAL EVERY 4 HOURS PRN
Status: DISCONTINUED | OUTPATIENT
Start: 2023-09-21 | End: 2023-09-21

## 2023-09-21 RX ORDER — ACETAMINOPHEN 325 MG/1
650 TABLET ORAL EVERY 6 HOURS SCHEDULED
Status: DISCONTINUED | OUTPATIENT
Start: 2023-09-21 | End: 2023-09-23 | Stop reason: HOSPADM

## 2023-09-21 RX ORDER — OXYTOCIN/RINGER'S LACTATE 30/500 ML
PLASTIC BAG, INJECTION (ML) INTRAVENOUS CONTINUOUS PRN
Status: DISCONTINUED | OUTPATIENT
Start: 2023-09-21 | End: 2023-09-21

## 2023-09-21 RX ORDER — DIPHENHYDRAMINE HYDROCHLORIDE 50 MG/ML
25 INJECTION INTRAMUSCULAR; INTRAVENOUS EVERY 6 HOURS PRN
Status: DISCONTINUED | OUTPATIENT
Start: 2023-09-21 | End: 2023-09-23 | Stop reason: HOSPADM

## 2023-09-21 RX ORDER — HYDRALAZINE HYDROCHLORIDE 20 MG/ML
5 INJECTION INTRAMUSCULAR; INTRAVENOUS
Status: DISCONTINUED | OUTPATIENT
Start: 2023-09-21 | End: 2023-09-23 | Stop reason: HOSPADM

## 2023-09-21 RX ORDER — ACETAMINOPHEN 325 MG/1
650 TABLET ORAL EVERY 6 HOURS SCHEDULED
Status: DISCONTINUED | OUTPATIENT
Start: 2023-09-21 | End: 2023-09-21

## 2023-09-21 RX ORDER — MORPHINE SULFATE 0.5 MG/ML
INJECTION, SOLUTION EPIDURAL; INTRATHECAL; INTRAVENOUS AS NEEDED
Status: DISCONTINUED | OUTPATIENT
Start: 2023-09-21 | End: 2023-09-21

## 2023-09-21 RX ADMIN — CEFAZOLIN SODIUM 2000 MG: 2 SOLUTION INTRAVENOUS at 15:41

## 2023-09-21 RX ADMIN — Medication 250 MILLI-UNITS/MIN: at 16:03

## 2023-09-21 RX ADMIN — NALBUPHINE HYDROCHLORIDE 5 MG: 10 INJECTION, SOLUTION INTRAMUSCULAR; INTRAVENOUS; SUBCUTANEOUS at 17:18

## 2023-09-21 RX ADMIN — SODIUM CHLORIDE, SODIUM LACTATE, POTASSIUM CHLORIDE, AND CALCIUM CHLORIDE 125 ML/HR: .6; .31; .03; .02 INJECTION, SOLUTION INTRAVENOUS at 14:49

## 2023-09-21 RX ADMIN — FENTANYL CITRATE 15 MCG: 50 INJECTION INTRAMUSCULAR; INTRAVENOUS at 15:40

## 2023-09-21 RX ADMIN — KETOROLAC TROMETHAMINE 30 MG: 30 INJECTION, SOLUTION INTRAMUSCULAR; INTRAVENOUS at 23:54

## 2023-09-21 RX ADMIN — PHENYLEPHRINE HYDROCHLORIDE 50 MCG/MIN: 10 INJECTION, SOLUTION INTRAVENOUS at 15:40

## 2023-09-21 RX ADMIN — MORPHINE SULFATE 0.15 MG: 0.5 INJECTION, SOLUTION EPIDURAL; INTRATHECAL; INTRAVENOUS at 15:40

## 2023-09-21 RX ADMIN — SODIUM CHLORIDE, SODIUM LACTATE, POTASSIUM CHLORIDE, AND CALCIUM CHLORIDE 125 ML/HR: .6; .31; .03; .02 INJECTION, SOLUTION INTRAVENOUS at 12:50

## 2023-09-21 RX ADMIN — Medication 62.5 MILLI-UNITS/MIN: at 19:22

## 2023-09-21 RX ADMIN — KETOROLAC TROMETHAMINE 30 MG: 30 INJECTION, SOLUTION INTRAMUSCULAR; INTRAVENOUS at 16:43

## 2023-09-21 RX ADMIN — BUPIVACAINE HYDROCHLORIDE IN DEXTROSE 1.6 ML: 7.5 INJECTION, SOLUTION SUBARACHNOID at 15:40

## 2023-09-21 RX ADMIN — ONDANSETRON 4 MG: 2 INJECTION INTRAMUSCULAR; INTRAVENOUS at 16:05

## 2023-09-21 RX ADMIN — ACETAMINOPHEN 650 MG: 325 TABLET, FILM COATED ORAL at 19:51

## 2023-09-21 RX ADMIN — DOCUSATE SODIUM 100 MG: 100 CAPSULE, LIQUID FILLED ORAL at 19:51

## 2023-09-21 RX ADMIN — METOCLOPRAMIDE 10 MG: 5 INJECTION, SOLUTION INTRAMUSCULAR; INTRAVENOUS at 15:39

## 2023-09-21 NOTE — ANESTHESIA PROCEDURE NOTES
Spinal Block    Patient location during procedure: OB  Start time: 9/21/2023 3:40 PM  Reason for block: procedure for pain and at surgeon's request  Staffing  Performed by: Nida Rhoades CRNA  Authorized by: Sathish Moore MD    Preanesthetic Checklist  Completed: patient identified, IV checked, site marked, risks and benefits discussed, surgical consent, monitors and equipment checked, pre-op evaluation and timeout performed  Spinal Block  Patient position: sitting  Prep: ChloraPrep  Patient monitoring: cardiac monitor and frequent blood pressure checks  Approach: midline  Location: L3-4  Injection technique: single-shot  Needle  Needle type: pencil-tip   Needle gauge: 25 G  Needle length: 10 cm  Assessment  Sensory level: T4  Injection Assessment:  negative aspiration for heme, no paresthesia on injection and positive aspiration for clear CSF.   Post-procedure:  adhesive bandage applied, pressure dressing applied, secured with tape, site cleaned and sterile dressing applied

## 2023-09-21 NOTE — ANESTHESIA POSTPROCEDURE EVALUATION
Post-Op Assessment Note    CV Status:  Stable  Pain Score: 0       Mental Status:  Alert and awake   Hydration Status:  Stable   PONV Controlled:  None   Airway Patency:  Patent      Post Op Vitals Reviewed: Yes      Staff: Anesthesiologist         No notable events documented.     BP   110/63   Temp   97.5F   Pulse 62   Resp   12   SpO2   97%

## 2023-09-21 NOTE — OP NOTE
OPERATIVE REPORT  PATIENT NAME: Debbie Pena    :  1990  MRN: [id number]  Pt Location: [facility] L&D OR ROOM 02    SURGERY DATE: 2023    Surgeon(s) and Role:     * Ricarda Herring MD - Primary     * Melissa Spencer DO - Assisting    Preop Diagnosis:  Patient-requested procedure [Z41.9]  Elective surgical procedure [Z41.9]  Request for sterilization [Z30.2]    Post-Op Diagnosis Codes:     * Patient-requested procedure [Z41.9]     * Elective surgical procedure [Z41.9]     * Request for sterilization [Z30.2]    Procedure(s) (LRB):   SECTION () (N/A)    Specimen(s):  ID Type Source Tests Collected by Time Destination   A :  Tissue (Placenta on Hold) OB Only Placenta PLACENTA IN STORAGE Ricarda Herring MD 2023 1602    B :  Blood, Arterial Cord BLOOD GAS, VENOUS, CORD, BLOOD GAS, ARTERIAL, CORD Domo Talavera MD 2023 1602        Surgical QBL:  Surgical QBL (mL): 593 mL      Drains:  Urethral Catheter Double-lumen;Non-latex 16 Fr.  (Active)   Number of days: 0     Anesthesia Type:  Spinal    Operative Indications:  Patient-requested procedure [Z41.9]  Elective surgical procedure [Z41.9]  Request for sterilization [Z30.2]       Ace Group Classification System:  No Multiple pregnancy, No Transverse or oblique lie, No Breech lie, Gestational age is > or =37 weeks, Nulliparous, Prelabor CD is ACE GROUP 2b      Section Procedure Note    Indications:   Maternal request for primary low transverse  section    Pre-operative Diagnosis:   39w6d pregnancy  GBS negative  Rh positive  Elevated BMI  Elevated blood pressure without diagnosis of hypertension    Post-operative Diagnosis:   1LTCS    Attending: Domo Talavera MD  Resident: Melissa Spencer DO    Maternal Findings:  Normal uterus, small fibroid noted on posterior aspect of uterus  Normal tubes and ovaries bilaterally  No adhesions  No difficulty noted from skin to delivery     Findings:  Viable male weighing 6lbs 8oz;  Apgar scores of 9 at one minute and 9 at five minutes. Clear amniotic fluid  Normal placenta with 3-vessel cord    Arterial and Venous Gases:  Umbilical Cord Venous Blood Gas:  Results from last 7 days   Lab Units 23  1602   PH COV  7.337   PCO2 COV mm HG 52.3*   HCO3 COV mmol/L 27.4   BASE EXC COV mmol/L 0.5*   O2 CT CD VB mL/dL 6.4   O2 HGB, VENOUS CORD % 51.5     Umbilical Cord Arterial Blood Gas: not collected    Specimens: Arterial and venous cord gases, cord blood, segment of umbilical cord, placenta to storage    Quantitative Blood Loss: 593 mL    Fluids: 1 L LR    Drains: Bal catheter           Complications:  None apparent           Disposition: PACU            Condition: stable    Procedure Details   The patient was seen prior to the procedure. Risks, benefits, possible complications, alternate treatment options, and expected outcomes were discussed with the patient. The patient agreed with the proposed plan and gave informed consent for a primary low transverse  section. The patient was taken to the [facility] Operating Room at [phone] where she received spinal anesthesia. For infection prophylaxis, she received 2g ancef  preoperatively. Fetal heart tones in the OR were assessed and noted to be within normal limits and a Bal catheter and SCDs were placed. The abdomen was prepped with Chloraprep, the vagina was prepped with Chlorhexidine, and following appropriate drying time, the patient was draped in the usual sterile manner. A Time Out was held and the above information confirmed. The patient was identified as Bakari Labrum and the procedure verified as a  Delivery. A Pfannenstiel incision was made and carried down through the underlying subcutaneous tissue to the fascia using a scalpel. Rectus fascia was then incised. We then proceeded in Rakesh-Snyder fashion. All anatomic layers were well-demarcated.  The rectus muscles were  and the peritoneum was identified, entered, and extended longitudinally with blunt dissection. The Andrei-O retractor was inserted and a transverse incision was made in the lower uterine segment using a new surgical blade. The uterine incision was extended cephalad and caudal using blunt dissection. The amniotic sac was entered. The surgeon's hand was placed into the uterine cavity. The fetal head was identified and elevated through the uterine incision with the assistance of fundal pressure. With gentle traction, the shoulder was delivered, followed by the rest of the fetal body. There was no nuchal cord noted. On delivery the cord was doubly clamped and cut after delayed cord clamping. The infant was then passed off the table to the awaiting  staff. The  was noted to cry spontaneously and moved all extremities. Venous and arterial blood gas, cord blood, and portion of cord was obtained for analysis and routine blood testing. The placenta delivery was then sent to storage. Placenta was noted to be intact with a centrally inserted three-vessel cord. Oxytocin was administered by IV infusion to enhance uterine contraction. The uterus was exteriorized and cleared of all clots and remaining products of conception. The uterine incision was re approximated using an 0 Vicryl in a running locked fashion. A second horizontal imbricating stitch with the same suture was applied. The uterine incision was examined and noted to be hemostatic. The posterior cul-de-sac was cleared of all clots and products of conception. The uterus was replaced into the abdomen and the pericolic gutters were cleared of all clots. The uterine incision was once again reexamined and noted to be hemostatic. The fascia was re approximated using an 0 Vicryl in a running nonlocked fashion. The subcutaneous tissue was irrigated and cleared of all clots and debris. Good hemostasis was noted with Bovie electrocautery. The subcutaneous tissue was reapproximated with interrupted suture of 2-0 Plain Gut. The skin incision was closed using 4-0 Monocryl with exofin. Good hemostasis was noted. Patient tolerated the procedure well. All needle, sponge, and instrument counts were noted to be correct x 2 at the end of the procedure. Patient was transferred to the recovery room in stable condition. Dr. Ihsan Garza was present and participated in the entire surgery.     SIGNATURE: Kaye Rodney DO  DATE: September 21, 2023  TIME: 4:57 PM

## 2023-09-21 NOTE — ANESTHESIA PREPROCEDURE EVALUATION
Procedure:   SECTION () (Uterus)  LIGATION/COAGULATION TUBAL (Bilateral: Abdomen)    Relevant Problems   GYN   (+) 39 weeks gestation of pregnancy        Physical Exam    Airway    Mallampati score: II         Dental       Cardiovascular  Cardiovascular exam normal    Pulmonary  Pulmonary exam normal     Other Findings        Anesthesia Plan  ASA Score- 2     Anesthesia Type- regional with ASA Monitors. Additional Monitors:   Airway Plan:           Plan Factors-Exercise tolerance (METS): >4 METS. Chart reviewed. EKG reviewed. Imaging results reviewed. Existing labs reviewed. Patient summary reviewed. Patient is not a current smoker. Patient not instructed to abstain from smoking on day of procedure. Induction-     Postoperative Plan-     Informed Consent- Anesthetic plan and risks discussed with patient. I personally reviewed this patient with the CRNA. Discussed and agreed on the Anesthesia Plan with the CRNA. Dusty Buchanan

## 2023-09-21 NOTE — H&P
H & P- Obstetrics   Olivia Watson 26 y.o. female MRN: [id number]  Unit/Bed#:  TRIAGE  Encounter: [id number]    Assessment: 26 y.o.  at 39w5d admitted for 1LTCS and BS. FHT: Baseline 130/Moderate variability/accelerations present/intermittent late and variable decelerations noted, Category II  Clinical EFW: 23% (1836g at 32wks) ; Cephalic confirmed by TAUS  GBS status: negative   Postpartum contraception plan: bilateral salpingectomy. Does not desire future pregnancy    Plan:   Desire for permanent contraception  Assessment & Plan   Does not desire future pregnancy. . Discussed as outpatient    Obesity affecting pregnancy in third trimester  Assessment & Plan  BMI 36    39 weeks gestation of pregnancy  Assessment & Plan  Admit   T&S, CBC, RPR  NPO  IV fluids  2g IV Ancef  To OR for elective 1LTCS          Discussed case and plan w/ Dr. Edgar Hand      Chief Complaint: Here for ceserean section    HPI: Olivia Watson is a 26 y.o. Donnise Greet with an POORNIMA of 2023, by Last Menstrual Period at 39w5d who is being admitted for elective primary ceserean section. She denies having uterine contractions, has no LOF, and reports no VB. She states she has felt good FM. Patient Active Problem List   Diagnosis   • Heartburn during pregnancy in third trimester   • 39 weeks gestation of pregnancy   • Obesity affecting pregnancy in third trimester   • Primigravida of advanced maternal age in third trimester   • Desire for permanent contraception   • Delivery by elective  section       Baby complications/comments: none    Review of Systems   Constitutional: Negative for chills and fever. HENT: Negative for ear pain and sore throat. Eyes: Negative for pain and visual disturbance. Respiratory: Negative for cough and shortness of breath. Cardiovascular: Negative for chest pain and palpitations. Gastrointestinal: Negative for abdominal pain and vomiting.    Genitourinary: Negative for dysuria and hematuria. Musculoskeletal: Negative for arthralgias and back pain. Skin: Negative for color change and rash. Neurological: Negative for seizures and syncope. All other systems reviewed and are negative. OB Hx:  OB History    Para Term  AB Living   2       1     SAB IAB Ectopic Multiple Live Births   1              # Outcome Date GA Lbr Go/2nd Weight Sex Delivery Anes PTL Lv   2 Current            1 SAB 2020 4w0d              Past Medical Hx:  No past medical history on file. Past Surgical hx:  Past Surgical History:   Procedure Laterality Date   • AUGMENTATION BREAST Bilateral        No Known Allergies    Medications Prior to Admission   Medication   • Prenatal Vit-DSS-Fe Fum-FA (Prenatal 19) tablet   • acyclovir (ZOVIRAX) 5 % ointment       Objective:  Temp:  [98.8 °F (37.1 °C)] 98.8 °F (37.1 °C)  HR:  [51] 51  Resp:  [18] 18  BP: (144)/(87) 144/87  Body mass index is 36.32 kg/m². Physical Exam:  Physical Exam  Constitutional:       General: She is not in acute distress. Appearance: Normal appearance. She is not ill-appearing or toxic-appearing. HENT:      Head: Normocephalic and atraumatic. Right Ear: External ear normal.      Left Ear: External ear normal.      Nose: Nose normal.      Mouth/Throat:      Mouth: Mucous membranes are moist.   Eyes:      General: Lids are normal.      Extraocular Movements: Extraocular movements intact. Conjunctiva/sclera: Conjunctivae normal.   Cardiovascular:      Rate and Rhythm: Normal rate and regular rhythm. Pulses: Normal pulses. Heart sounds: Normal heart sounds. No murmur heard. Pulmonary:      Effort: Pulmonary effort is normal. No respiratory distress. Abdominal:      Palpations: Abdomen is soft. Tenderness: There is no abdominal tenderness. Musculoskeletal:      Cervical back: Full passive range of motion without pain and normal range of motion. Right lower leg: No edema. Left lower leg: No edema. Right foot: Normal range of motion. Left foot: Normal range of motion. Neurological:      General: No focal deficit present. Mental Status: She is alert. Motor: No weakness. Skin:     General: Skin is warm and dry. Psychiatric:         Mood and Affect: Mood normal.         Judgment: Judgment normal.   Vitals reviewed. Exam conducted with a chaperone present.             FHT:  Baseline 130, moderate variability, accelerations present, intermittent late and variable delecerations, category II    TOCO:   Contractions every 8-10 minutes    Lab Results   Component Value Date    WBC 10.98 (H) 09/21/2023    HGB 13.2 09/21/2023    HCT 39.4 09/21/2023     09/21/2023     No results found for: "NA", "K", "CL", "CO2", "BUN", "CREATININE", "GLUCOSE", "AST", "ALT"  Prenatal Labs: Reviewed     Blood type: O positive  Antibody: Negative  GBS: Negative  HIV: non-reactive  Rubella: immune  Syphilis IgM/IgG: non-reactive  HBsAg: non-reactive  HCAb: non-reactive  Chlamydia: negative  Gonorrhea: negative  Diabetes 1 hour screen: Normal  Platelets: 722,802  Hgb: 12.6  >2 Midnights  INPATIENT     Signature/Title: Edda Morrissey MD  Date: 9/21/2023  Time: 1:54 PM

## 2023-09-21 NOTE — ASSESSMENT & PLAN NOTE
Systolic (64QSZ), ZJX:109 , Min:104 , MRP:827   Diastolic (60HWE), QOT:44, Min:50, Max:64  CBC wnl  CMP and PC ratio . 77 ( post delivery)  Asymptomatic  No other documented elevated BP in this pregnancy-BP currently stable, will continue to monitor. BP check one week outpatient.

## 2023-09-21 NOTE — PLAN OF CARE
Problem: BIRTH - VAGINAL/ SECTION  Goal: Fetal and maternal status remain reassuring during the birth process  Description: INTERVENTIONS:  - Monitor vital signs  - Monitor fetal heart rate  - Monitor uterine activity  - DVT prophylaxis  - Antibiotic prophylaxis  Outcome: Completed  Goal: Emotionally satisfying birthing experience for mother/fetus  Description: Interventions:  - Assess, plan, implement and evaluate the nursing care given to the patient in labor  - Advocate the philosophy that each childbirth experience is a unique experience and support the family's chosen level of involvement and control during the labor process   - Actively participate in both the patient's and family's teaching of the birth process  - Consider cultural, [facility] and age-specific factors and plan care for the patient in labor  Outcome: Completed

## 2023-09-21 NOTE — PLAN OF CARE
Problem: PAIN - ADULT  Goal: Verbalizes/displays adequate comfort level or baseline comfort level  Description: Interventions:  - Encourage patient to monitor pain and request assistance  - Assess pain using appropriate pain scale  - Administer analgesics based on type and severity of pain and evaluate response  - Implement non-pharmacological measures as appropriate and evaluate response  - Consider cultural and social influences on pain and pain management  - Notify physician/advanced practitioner if interventions unsuccessful or patient reports new pain  Outcome: Progressing     Problem: INFECTION - ADULT  Goal: Absence or prevention of progression during hospitalization  Description: INTERVENTIONS:  - Assess and monitor for signs and symptoms of infection  - Monitor lab/diagnostic results  - Monitor all insertion sites, i.e. indwelling lines, tubes, and drains  - Monitor endotracheal if appropriate and nasal secretions for changes in amount and color  - [place] appropriate cooling/warming therapies per order  - Administer medications as ordered  - Instruct and encourage patient and family to use good hand hygiene technique    Outcome: Progressing  Goal: Absence of fever/infection during neutropenic period  Description: INTERVENTIONS:  - Monitor WBC    Outcome: Progressing     Problem: SAFETY ADULT  Goal: Patient will remain free of falls  Description: INTERVENTIONS:  - Educate patient/family on patient safety including physical limitations  - Instruct patient to call for assistance with activity   - Keep Call bell within reach  - Keep bed low and locked with side rails adjusted as appropriate  - Keep care items and personal belongings within reach  - Initiate and maintain comfort rounds  - Offer Toileting every in advance of need    Outcome: Progressing     Problem: DISCHARGE PLANNING  Goal: Discharge to home or other facility with appropriate resources  Description: INTERVENTIONS:  - Identify barriers to discharge w/patient and caregiver  - Arrange for needed discharge resources and transportation as appropriate  - Identify discharge learning needs (meds, wound care, etc.)  - Arrange for interpretive services to assist at discharge as needed  - Refer to Case Management Department for coordinating discharge planning if the patient needs post-hospital services based on physician/advanced practitioner order or complex needs related to functional status, cognitive ability, or social support system  Outcome: Progressing     Problem: Knowledge Deficit  Goal: Patient/family/caregiver demonstrates understanding of disease process, treatment plan, medications, and discharge instructions  Description: Complete learning assessment and assess knowledge base.   Interventions:  - Provide teaching at level of understanding  - Provide teaching via preferred learning methods  Outcome: Progressing     Problem: BIRTH - VAGINAL/ SECTION  Goal: Fetal and maternal status remain reassuring during the birth process  Description: INTERVENTIONS:  - Monitor vital signs  - Monitor fetal heart rate  - Monitor uterine activity  - DVT prophylaxis  - Antibiotic prophylaxis  Outcome: Progressing  Goal: Emotionally satisfying birthing experience for mother/fetus  Description: Interventions:  - Assess, plan, implement and evaluate the nursing care given to the patient in labor  - Advocate the philosophy that each childbirth experience is a unique experience and support the family's chosen level of involvement and control during the labor process   - Actively participate in both the patient's and family's teaching of the birth process  - Consider cultural, Confucianism and age-specific factors and plan care for the patient in labor  Outcome: Progressing

## 2023-09-22 LAB
BASOPHILS # BLD AUTO: 0.02 THOUSANDS/ÂΜL (ref 0–0.1)
BASOPHILS NFR BLD AUTO: 0 % (ref 0–1)
EOSINOPHIL # BLD AUTO: 0.11 THOUSAND/ÂΜL (ref 0–0.61)
EOSINOPHIL NFR BLD AUTO: 1 % (ref 0–6)
ERYTHROCYTE [DISTWIDTH] IN BLOOD BY AUTOMATED COUNT: 12.4 % (ref 11.6–15.1)
HCT VFR BLD AUTO: 34.6 % (ref 34.8–46.1)
HGB BLD-MCNC: 11.6 G/DL (ref 11.5–15.4)
IMM GRANULOCYTES # BLD AUTO: 0.08 THOUSAND/UL (ref 0–0.2)
IMM GRANULOCYTES NFR BLD AUTO: 1 % (ref 0–2)
LYMPHOCYTES # BLD AUTO: 2.1 THOUSANDS/ÂΜL (ref 0.6–4.47)
LYMPHOCYTES NFR BLD AUTO: 17 % (ref 14–44)
MCH RBC QN AUTO: 31.6 PG (ref 26.8–34.3)
MCHC RBC AUTO-ENTMCNC: 33.5 G/DL (ref 31.4–37.4)
MCV RBC AUTO: 94 FL (ref 82–98)
MONOCYTES # BLD AUTO: 0.9 THOUSAND/ÂΜL (ref 0.17–1.22)
MONOCYTES NFR BLD AUTO: 7 % (ref 4–12)
NEUTROPHILS # BLD AUTO: 9.4 THOUSANDS/ÂΜL (ref 1.85–7.62)
NEUTS SEG NFR BLD AUTO: 74 % (ref 43–75)
NRBC BLD AUTO-RTO: 0 /100 WBCS
PLATELET # BLD AUTO: 169 THOUSANDS/UL (ref 149–390)
PMV BLD AUTO: 10.1 FL (ref 8.9–12.7)
RBC # BLD AUTO: 3.67 MILLION/UL (ref 3.81–5.12)
WBC # BLD AUTO: 12.61 THOUSAND/UL (ref 4.31–10.16)

## 2023-09-22 PROCEDURE — 85025 COMPLETE CBC W/AUTO DIFF WBC: CPT

## 2023-09-22 PROCEDURE — 99024 POSTOP FOLLOW-UP VISIT: CPT | Performed by: OBSTETRICS & GYNECOLOGY

## 2023-09-22 RX ORDER — ENOXAPARIN SODIUM 100 MG/ML
40 INJECTION SUBCUTANEOUS
Status: DISCONTINUED | OUTPATIENT
Start: 2023-09-22 | End: 2023-09-23 | Stop reason: HOSPADM

## 2023-09-22 RX ADMIN — ACETAMINOPHEN 650 MG: 325 TABLET, FILM COATED ORAL at 18:22

## 2023-09-22 RX ADMIN — ENOXAPARIN SODIUM 40 MG: 40 INJECTION SUBCUTANEOUS at 10:11

## 2023-09-22 RX ADMIN — KETOROLAC TROMETHAMINE 30 MG: 30 INJECTION, SOLUTION INTRAMUSCULAR; INTRAVENOUS at 06:23

## 2023-09-22 RX ADMIN — ACETAMINOPHEN 650 MG: 325 TABLET, FILM COATED ORAL at 10:11

## 2023-09-22 RX ADMIN — NALBUPHINE HYDROCHLORIDE 5 MG: 10 INJECTION, SOLUTION INTRAMUSCULAR; INTRAVENOUS; SUBCUTANEOUS at 02:38

## 2023-09-22 RX ADMIN — KETOROLAC TROMETHAMINE 30 MG: 30 INJECTION, SOLUTION INTRAMUSCULAR; INTRAVENOUS at 12:35

## 2023-09-22 RX ADMIN — ACETAMINOPHEN 650 MG: 325 TABLET, FILM COATED ORAL at 02:38

## 2023-09-22 RX ADMIN — KETOROLAC TROMETHAMINE 30 MG: 30 INJECTION, SOLUTION INTRAMUSCULAR; INTRAVENOUS at 18:22

## 2023-09-22 RX ADMIN — DOCUSATE SODIUM 100 MG: 100 CAPSULE, LIQUID FILLED ORAL at 18:22

## 2023-09-22 RX ADMIN — NALBUPHINE HYDROCHLORIDE 5 MG: 10 INJECTION, SOLUTION INTRAMUSCULAR; INTRAVENOUS; SUBCUTANEOUS at 11:09

## 2023-09-22 RX ADMIN — DOCUSATE SODIUM 100 MG: 100 CAPSULE, LIQUID FILLED ORAL at 10:11

## 2023-09-22 NOTE — LACTATION NOTE
This note was copied from a baby's chart. CONSULT - LACTATION  Baby Boy Brisa Casillas 1 days male MRN: [id number]    [address] [facility] NUR[facility] Room / Bed: (N)/(N) Encounter: [id number]    Maternal Information     MOTHER:  Ozzy Casillas  Maternal Age: 26 y.o.   OB History: # 1 - Date: , Sex: None, Weight: None, GA: 4w0d, Delivery: None, Apgar1: None, Apgar5: None, Living: None, Birth Comments: None    # 2 - Date: 23, Sex: Male, Weight: 2948 g (6 lb 8 oz), GA: 39w6d, Delivery: , Low Transverse, Apgar1: 9, Apgar5: 9, Living: Living, Birth Comments: None   Previous breast augmentation surgery? Yes    Lactation history:   Has patient previously breast fed: How long had patient previously breast fed:     Previous breast feeding complications:       Past Surgical History:   Procedure Laterality Date   • AUGMENTATION BREAST Bilateral    • [place]  DELIVERY ONLY N/A 2023    Procedure:  SECTION (); Surgeon: Aisha Aguilar MD;  Location: [facility] ;  Service: Obstetrics        Birth information:  YOB: 2025   Time of birth: 3:46 PM   Sex: male   Delivery type: , Low Transverse   Birth Weight: 2948 g (6 lb 8 oz)   Percent of Weight Change: -1%     Gestational Age: 36w10d   [unfilled]    Assessment     Breast and nipple assessment: scarring in shape of inverted T     Assessment: sleepy    Feeding assessment: feeding well  LATCH:  Latch: Grasps breast, tongue down, lips flanged, rhythmic sucking   Audible Swallowing: Spontaneous and intermittent (24 hours old)   Type of Nipple: Everted (After stimulation)   Comfort (Breast/Nipple): Soft/non-tender   Hold (Positioning): Partial assist, teach one side, mother does other, staff holds   [facility] Score: 9         Having latch problems?  No  (sleepy)   Position(s) Used Football   Breasts/Nipples   Left Breast Soft   Right Breast Soft   Left Nipple Everted   Right Nipple Everted   Intervention Hand expression   Breastfeeding Progress Not yet established  (had a good feeding with centric latch family was able to capture on video.)   Breast Pump   Pump 3  (Has Cimilre S6)   Patient Follow-Up   Lactation Consult Status 2   Follow-Up Type Inpatient;Call as needed   Other OB Lactation Documentation    Additional Problem Noted Ricardo Joshua was seen latching to the breast shallowly and centrically on video family captured. He was too sleepy to latch at this time. HE effective. (RSB preferred in [facility]. LLI information given in [facility].)       Feeding recommendations:  breast feed on demand     Information on hand expression given. Discussed benefits of knowing how to manually express breast including stimulating milk supply, softening nipple for latch and evacuating breast in the event of engorgement. Reviewed how to bring baby to the breast so that his lower lip and chin touch the breast with his nose just above the nipple to encourage a wider, more asymmetric latch. Gently compress the breast as if offering a sandwich with your fingers and thumb in parallel with Baby Boy's lips. Bring Baby Boy to the breast so that his lower lip and chin touch the breast with his nose just above the nipple. Met with mother. Provided mother with Ready, Set, Baby booklet which contained information on:  Hand expression with access to QR codes to review hand expression. Positioning and latch reviewed as well as showing images of other feeding positions. Discussed the properties of a good latch in any position. Feeding on cue and what that means for recognizing infant's hunger, s/s that baby is getting enough milk and some s/s that breastfeeding dyad may need further help  Skin to Skin contact and benefits to mom and baby  Avoidance of pacifiers for the first month discussed.    Gave information on common concerns, what to expect the first few weeks after delivery, preparing for other caregivers, and how partners can help. Resources for support also provided. Encouraged parents to call for assistance, questions, and concerns about breastfeeding. Extension provided.       Gaston Barragan RN 9/22/2023 11:58 AM

## 2023-09-22 NOTE — ASSESSMENT & PLAN NOTE
• Routine postpartum care  • Encourage ambulation  • Encourage familial bonding  • Lactation support as needed  • Pain: Motrin/Tylenol around the clock, oxycodone if needed  • Postpartum Contraceptive plan: declines inpatient; readdress outpatient  • Pre-delivery Hgb 13.2 --> Post Delivery 11.6  • Bal catheter: out, voidx1, follow up void trial 9/22  • DVT Ppx: ambulation, SCDs, Lovenox 40mg

## 2023-09-22 NOTE — PLAN OF CARE
Problem: PAIN - ADULT  Goal: Verbalizes/displays adequate comfort level or baseline comfort level  Description: Interventions:  - Encourage patient to monitor pain and request assistance  - Assess pain using appropriate pain scale  - Administer analgesics based on type and severity of pain and evaluate response  - Implement non-pharmacological measures as appropriate and evaluate response  - Consider cultural and social influences on pain and pain management  - Notify physician/advanced practitioner if interventions unsuccessful or patient reports new pain  Outcome: Progressing     Problem: INFECTION - ADULT  Goal: Absence or prevention of progression during hospitalization  Description: INTERVENTIONS:  - Assess and monitor for signs and symptoms of infection  - Monitor lab/diagnostic results  - Monitor all insertion sites, i.e. indwelling lines, tubes, and drains  - Monitor endotracheal if appropriate and nasal secretions for changes in amount and color  - [place] appropriate cooling/warming therapies per order  - Administer medications as ordered  - Instruct and encourage patient and family to use good hand hygiene technique    Outcome: Progressing  Goal: Absence of fever/infection during neutropenic period  Description: INTERVENTIONS:  - Monitor WBC    Outcome: Progressing     Problem: SAFETY ADULT  Goal: Patient will remain free of falls  Description: INTERVENTIONS:  - Educate patient/family on patient safety including physical limitations  - Instruct patient to call for assistance with activity   - Keep Call bell within reach  - Keep bed low and locked with side rails adjusted as appropriate  - Keep care items and personal belongings within reach  - Initiate and maintain comfort rounds  - Offer Toileting every in advance of need    Outcome: Progressing     Problem: DISCHARGE PLANNING  Goal: Discharge to home or other facility with appropriate resources  Description: INTERVENTIONS:  - Identify barriers to discharge w/patient and caregiver  - Arrange for needed discharge resources and transportation as appropriate  - Identify discharge learning needs (meds, wound care, etc.)  - Arrange for interpretive services to assist at discharge as needed  - Refer to Case Management Department for coordinating discharge planning if the patient needs post-hospital services based on physician/advanced practitioner order or complex needs related to functional status, cognitive ability, or social support system  Outcome: Progressing     Problem: Knowledge Deficit  Goal: Patient/family/caregiver demonstrates understanding of disease process, treatment plan, medications, and discharge instructions  Description: Complete learning assessment and assess knowledge base.   Interventions:  - Provide teaching at level of understanding  - Provide teaching via preferred learning methods  Outcome: Progressing     Problem: POSTPARTUM  Goal: Experiences normal postpartum course  Description: INTERVENTIONS:  - Monitor maternal vital signs  - Assess uterine involution and lochia  Outcome: Progressing  Goal: Appropriate maternal -  bonding  Description: INTERVENTIONS:  - Identify family support  - Assess for appropriate maternal/infant bonding   -Encourage maternal/infant bonding opportunities  - Referral to  or  as needed  Outcome: Progressing  Goal: Establishment of infant feeding pattern  Description: INTERVENTIONS:  - Assess breast/bottle feeding  - Refer to lactation as needed  Outcome: Progressing  Goal: Incision(s), wounds(s) or drain site(s) healing without S/S of infection  Description: INTERVENTIONS  - Assess and document dressing, incision, wound bed, drain sites and surrounding tissue  - Provide patient and family education  - Perform skin care/dressing changes every   Outcome: Progressing

## 2023-09-22 NOTE — PLAN OF CARE
Problem: PAIN - ADULT  Goal: Verbalizes/displays adequate comfort level or baseline comfort level  Description: Interventions:  - Encourage patient to monitor pain and request assistance  - Assess pain using appropriate pain scale  - Administer analgesics based on type and severity of pain and evaluate response  - Implement non-pharmacological measures as appropriate and evaluate response  - Consider cultural and social influences on pain and pain management  - Notify physician/advanced practitioner if interventions unsuccessful or patient reports new pain  Outcome: Progressing     Problem: INFECTION - ADULT  Goal: Absence or prevention of progression during hospitalization  Description: INTERVENTIONS:  - Assess and monitor for signs and symptoms of infection  - Monitor lab/diagnostic results  - Monitor all insertion sites, i.e. indwelling lines, tubes, and drains  - Monitor endotracheal if appropriate and nasal secretions for changes in amount and color  - [place] appropriate cooling/warming therapies per order  - Administer medications as ordered  - Instruct and encourage patient and family to use good hand hygiene technique    Outcome: Progressing  Goal: Absence of fever/infection during neutropenic period  Description: INTERVENTIONS:  - Monitor WBC    Outcome: Progressing     Problem: SAFETY ADULT  Goal: Patient will remain free of falls  Description: INTERVENTIONS:  - Educate patient/family on patient safety including physical limitations  - Instruct patient to call for assistance with activity   - Keep Call bell within reach  - Keep bed low and locked with side rails adjusted as appropriate  - Keep care items and personal belongings within reach  - Initiate and maintain comfort rounds  - Offer Toileting every in advance of need    Outcome: Progressing     Problem: DISCHARGE PLANNING  Goal: Discharge to home or other facility with appropriate resources  Description: INTERVENTIONS:  - Identify barriers to discharge w/patient and caregiver  - Arrange for needed discharge resources and transportation as appropriate  - Identify discharge learning needs (meds, wound care, etc.)  - Arrange for interpretive services to assist at discharge as needed  - Refer to Case Management Department for coordinating discharge planning if the patient needs post-hospital services based on physician/advanced practitioner order or complex needs related to functional status, cognitive ability, or social support system  Outcome: Progressing     Problem: Knowledge Deficit  Goal: Patient/family/caregiver demonstrates understanding of disease process, treatment plan, medications, and discharge instructions  Description: Complete learning assessment and assess knowledge base.   Interventions:  - Provide teaching at level of understanding  - Provide teaching via preferred learning methods  Outcome: Progressing     Problem: POSTPARTUM  Goal: Experiences normal postpartum course  Description: INTERVENTIONS:  - Monitor maternal vital signs  - Assess uterine involution and lochia  Outcome: Progressing  Goal: Appropriate maternal -  bonding  Description: INTERVENTIONS:  - Identify family support  - Assess for appropriate maternal/infant bonding   -Encourage maternal/infant bonding opportunities  - Referral to  or  as needed  Outcome: Progressing  Goal: Establishment of infant feeding pattern  Description: INTERVENTIONS:  - Assess breast/bottle feeding  - Refer to lactation as needed  Outcome: Progressing  Goal: Incision(s), wounds(s) or drain site(s) healing without S/S of infection  Description: INTERVENTIONS  - Assess and document dressing, incision, wound bed, drain sites and surrounding tissue  - Provide patient and family education  - Perform skin care/dressing changes every   Outcome: Progressing

## 2023-09-22 NOTE — PROGRESS NOTES
Progress Note - OB/GYN   Gerald Swenson 26 y.o. female MRN: [id number]  Unit/Bed#: -01 Encounter: [id number]      Assessment/Plan    Gerald Swenson is a 26 y.o.  who is PPD 1 s/p LTCS at 39w6d     Elevated blood-pressure reading, without diagnosis of hypertension  Assessment & Plan  Systolic (60YWB), HVB:860 , Min:103 , SEK:378   Diastolic (27HMS), BKH:62, Min:53, Max:87  CBC wnl  CMP and PC ratio pending  Asymptomatic  No other documented elevated BP in this pregnancy        * Delivery by elective  section  Assessment & Plan  • Routine postpartum care  • Encourage ambulation  • Encourage familial bonding  • Lactation support as needed  • Pain: Motrin/Tylenol around the clock, oxycodone if needed  • Postpartum Contraceptive plan: declines inpatient; readdress outpatient  • Pre-delivery Hgb 13.2 --> Post Delivery 11.6  • Bal catheter: out, voidx1, follow up void trial   • DVT Ppx: ambulation, SCDs, Lovenox 40mg       Disposition: Anticipate discharge home postpartum Day #2-3  Barriers to discharge: ongoing couplet care       Subjective/Objective     Subjective: Postpartum state    Pain: no  Tolerating PO: yes  Voiding: yes  Flatus: yes  BM: no  Ambulating: yes  Breastfeeding: Breastfeeding  Chest pain: no  Shortness of breath: no  Leg pain: no  Lochia: minimal    Objective:     Vitals:  Vitals:    23 2105 23 2211 23 2358 23 0420   BP: 103/53 104/66 115/62 116/67   BP Location: Right arm  Right arm Left arm   Pulse: 64 60 55 61   Resp: 18 16 20 20   Temp: 97.7 °F (36.5 °C) 98 °F (36.7 °C) 97.7 °F (36.5 °C) 97.7 °F (36.5 °C)   TempSrc: Oral Oral Oral Oral   SpO2: 96% 95% 97% 98%   Weight:       Height:           Physical Exam:   GEN: appears well, alert and oriented x 3, pleasant and cooperative   CV: Regular rate  RESP: non labored breathing  ABDOMEN: soft, no tenderness, no distention, Uterine fundus firm and non-tender, -1 cm below the umbilicus, incision c/d/i  EXTREMITIES: non-tender  NEURO Alert and oriented to person, place, and time.        Lab Results   Component Value Date    WBC 12.61 (H) 09/22/2023    HGB 11.6 09/22/2023    HCT 34.6 (L) 09/22/2023    MCV 94 09/22/2023     09/22/2023         Nya Chamorro MD  Obstetrics & Gynecology  09/22/23

## 2023-09-22 NOTE — LACTATION NOTE
This note was copied from a baby's chart. Follow up Lactation: mom called for help with latching. Mom states baby hasn't latched since last time with lactation. When asking mom questions about her breast surgery from 2016. No visible scarring around areolas. Visible anchor in lower quadrants. Mom states the procedure was just to remove "soft" tissue, and insert silicone. Ed. On how removal of glandular tissue can affect milk supply. Ed. On allowing non-nutritive suck, monitoring output. Mom is attempting in the football hold. Baby is under the breast and chin is angled the chest.    Ed. On s2s. Use of pillows to lift the baby and the breast.    Ed. On alignment and chin deep into the breast tissue. Ed. On U shape hold of the breast. Enc. Thumb behind the areola in line with the nose, and four fingers behind the chin. Ear, shoulder, hip alignment. Active, coordinated sucking noted. Ed. On cluster feeding and second night syndrome. Demonstration and teach back of breast compressions    Enc. To call lactation for next latch    Education on positioning and alignment. Mom is encouraged to:     - Bring baby up to the breast (use of pillows to elevate so baby's torso is against mom's breasts)   - Skin to skin for feedings with top hand exposed to show signs of satiation   - Chin deep into breast tissue (make baby look up to the nipple)   - nose aligned to the nipple   -Wait for wide gape, drag chin on the breast so nipple is aimed at the upper, back palate  - Cheek should be touching breast   - Deep, firm hold of baby with ear, shoulder, hip alignment    Provided demonstration, education and support of deep latch to breast by placing the nipple to the nose, dragging down to chin to achieve a wide latch. Bring baby to the breast, not breast to baby. Move your shoulders down and away from your ears. Look for ear, shoulder, hip alignment.  Baby's upper and lower lip should be flanged on the breast.    Discussed 2nd night syndrome and ways to calm infant. Hand out given. Information on hand expression given. Discussed benefits of knowing how to manually express breast including stimulating milk supply, softening nipple for latch and evacuating breast in the event of engorgement. Mom is encouraged to meet early feeding cues, allow for non-nutritive suck, use breast compressions, and monitor baby's output. Mom wants to review feeding logs to verify if baby is meeting daily output of wet and soiled diapers. Demonstrated with teach back breast compressions during a feeding to increase milk transfer and stimulate suckling after a breathing/muscle break. Encouraged parents to call for assistance, questions, and concerns about breastfeeding. Extension provided.

## 2023-09-23 VITALS
TEMPERATURE: 97.7 F | BODY MASS INDEX: 36.16 KG/M2 | SYSTOLIC BLOOD PRESSURE: 104 MMHG | OXYGEN SATURATION: 96 % | HEART RATE: 53 BPM | HEIGHT: 66 IN | RESPIRATION RATE: 18 BRPM | DIASTOLIC BLOOD PRESSURE: 50 MMHG | WEIGHT: 225 LBS

## 2023-09-23 PROCEDURE — NC001 PR NO CHARGE: Performed by: OBSTETRICS & GYNECOLOGY

## 2023-09-23 PROCEDURE — 99024 POSTOP FOLLOW-UP VISIT: CPT | Performed by: OBSTETRICS & GYNECOLOGY

## 2023-09-23 RX ORDER — IBUPROFEN 600 MG/1
600 TABLET ORAL EVERY 6 HOURS
Qty: 30 TABLET | Refills: 0
Start: 2023-09-23

## 2023-09-23 RX ORDER — DOCUSATE SODIUM 100 MG/1
100 CAPSULE, LIQUID FILLED ORAL 2 TIMES DAILY
Refills: 0
Start: 2023-09-23

## 2023-09-23 RX ORDER — ACETAMINOPHEN 325 MG/1
650 TABLET ORAL EVERY 6 HOURS SCHEDULED
Qty: 12 TABLET | Refills: 0
Start: 2023-09-23 | End: 2023-09-25

## 2023-09-23 RX ORDER — ALBUTEROL SULFATE 2.5 MG/3ML
2.5 SOLUTION RESPIRATORY (INHALATION) ONCE AS NEEDED
Refills: 0
Start: 2023-09-23

## 2023-09-23 RX ADMIN — DOCUSATE SODIUM 100 MG: 100 CAPSULE, LIQUID FILLED ORAL at 09:29

## 2023-09-23 RX ADMIN — ACETAMINOPHEN 650 MG: 325 TABLET, FILM COATED ORAL at 05:40

## 2023-09-23 RX ADMIN — ACETAMINOPHEN 650 MG: 325 TABLET, FILM COATED ORAL at 00:38

## 2023-09-23 RX ADMIN — ACETAMINOPHEN 650 MG: 325 TABLET, FILM COATED ORAL at 12:20

## 2023-09-23 RX ADMIN — ENOXAPARIN SODIUM 40 MG: 40 INJECTION SUBCUTANEOUS at 09:29

## 2023-09-23 RX ADMIN — IBUPROFEN 600 MG: 600 TABLET, FILM COATED ORAL at 12:20

## 2023-09-23 RX ADMIN — KETOROLAC TROMETHAMINE 30 MG: 30 INJECTION, SOLUTION INTRAMUSCULAR; INTRAVENOUS at 05:40

## 2023-09-23 RX ADMIN — KETOROLAC TROMETHAMINE 30 MG: 30 INJECTION, SOLUTION INTRAMUSCULAR; INTRAVENOUS at 00:38

## 2023-09-23 NOTE — PLAN OF CARE
Problem: PAIN - ADULT  Goal: Verbalizes/displays adequate comfort level or baseline comfort level  Description: Interventions:  - Encourage patient to monitor pain and request assistance  - Assess pain using appropriate pain scale  - Administer analgesics based on type and severity of pain and evaluate response  - Implement non-pharmacological measures as appropriate and evaluate response  - Consider cultural and social influences on pain and pain management  - Notify physician/advanced practitioner if interventions unsuccessful or patient reports new pain  Outcome: Adequate for Discharge     Problem: INFECTION - ADULT  Goal: Absence or prevention of progression during hospitalization  Description: INTERVENTIONS:  - Assess and monitor for signs and symptoms of infection  - Monitor lab/diagnostic results  - Monitor all insertion sites, i.e. indwelling lines, tubes, and drains  - Monitor endotracheal if appropriate and nasal secretions for changes in amount and color  - [place] appropriate cooling/warming therapies per order  - Administer medications as ordered  - Instruct and encourage patient and family to use good hand hygiene technique    Outcome: Adequate for Discharge  Goal: Absence of fever/infection during neutropenic period  Description: INTERVENTIONS:  - Monitor WBC    Outcome: Adequate for Discharge     Problem: SAFETY ADULT  Goal: Patient will remain free of falls  Description: INTERVENTIONS:  - Educate patient/family on patient safety including physical limitations  - Instruct patient to call for assistance with activity   - Keep Call bell within reach  - Keep bed low and locked with side rails adjusted as appropriate  - Keep care items and personal belongings within reach  - Initiate and maintain comfort rounds  - Offer Toileting every in advance of need    Outcome: Adequate for Discharge     Problem: DISCHARGE PLANNING  Goal: Discharge to home or other facility with appropriate resources  Description: INTERVENTIONS:  - Identify barriers to discharge w/patient and caregiver  - Arrange for needed discharge resources and transportation as appropriate  - Identify discharge learning needs (meds, wound care, etc.)  - Arrange for interpretive services to assist at discharge as needed  - Refer to Case Management Department for coordinating discharge planning if the patient needs post-hospital services based on physician/advanced practitioner order or complex needs related to functional status, cognitive ability, or social support system  Outcome: Adequate for Discharge     Problem: Knowledge Deficit  Goal: Patient/family/caregiver demonstrates understanding of disease process, treatment plan, medications, and discharge instructions  Description: Complete learning assessment and assess knowledge base.   Interventions:  - Provide teaching at level of understanding  - Provide teaching via preferred learning methods  Outcome: Adequate for Discharge     Problem: POSTPARTUM  Goal: Experiences normal postpartum course  Description: INTERVENTIONS:  - Monitor maternal vital signs  - Assess uterine involution and lochia  Outcome: Adequate for Discharge  Goal: Appropriate maternal -  bonding  Description: INTERVENTIONS:  - Identify family support  - Assess for appropriate maternal/infant bonding   -Encourage maternal/infant bonding opportunities  - Referral to  or  as needed  Outcome: Adequate for Discharge  Goal: Establishment of infant feeding pattern  Description: INTERVENTIONS:  - Assess breast/bottle feeding  - Refer to lactation as needed  Outcome: Adequate for Discharge  Goal: Incision(s), wounds(s) or drain site(s) healing without S/S of infection  Description: INTERVENTIONS  - Assess and document dressing, incision, wound bed, drain sites and surrounding tissue  - Provide patient and family education  - Perform skin care/dressing changes ev  Outcome: Adequate for Discharge

## 2023-09-23 NOTE — PROGRESS NOTES
[facility]  Progress Note  Name: Lebron Kendall  MRN: [id number]  Unit/Bed#: -01 I Date of Admission: 2023   Date of Service: 2023 I Hospital Day: 2    Assessment/Plan   Elevated blood-pressure reading, without diagnosis of hypertension  Assessment & Plan  Systolic (47JEN), NZD:465 , Min:104 , GYK:711   Diastolic (84DTF), UVP:80, Min:50, Max:64  CBC wnl  CMP and PC ratio . 77 ( post delivery)  Asymptomatic  No other documented elevated BP in this pregnancy-BP currently stable, will continue to monitor. BP check one week outpatient. * Delivery by elective  section  Assessment & Plan  • Routine postpartum care  • Encourage ambulation  • Encourage familial bonding  • Lactation support as needed  • Pain: Motrin/Tylenol around the clock, oxycodone if needed  • Postpartum Contraceptive plan: declines inpatient; readdress outpatient  • Pre-delivery Hgb 13.2 --> Post Delivery 11.6  • Bal catheter: out, voidx1, follow up void trial   • DVT Ppx: ambulation, SCDs, Lovenox 40mg                Subjective/Objective   Chief Complaint: Feels well    Subjective: Pain is controlled with current analgesics. Medications being used: acetaminophen and ibuprofen (OTC). Eating a regular diet without difficulty. Flatus Yes . Bowel movements are normal. Voiding without difficulty. Ambulating Yes  Breastfeeding Yes  Lochia normal     Objective:     Vitals: Blood pressure 104/50, pulse (!) 53, temperature 97.7 °F (36.5 °C), temperature source Oral, resp. rate 18, height 5' 6" (1.676 m), weight 102 kg (225 lb), last menstrual period 2022, SpO2 96 %, currently breastfeeding. ,Body mass index is 36.32 kg/m².     No intake or output data in the 24 hours ending 23 0944    Invasive Devices     Peripheral Intravenous Line  Duration           Peripheral IV 23 Distal;Left;Dorsal (posterior) Forearm 1 day                Physical Exam:   Physical Exam Constitutional: She appears well-developed and well-nourished. Cardiovascular: Normal rate, regular rhythm and normal heart sounds. Pulmonary/Chest: Effort normal and breath sounds normal.   Abdominal: Soft. Bowel sounds are normal. She exhibits no distension. There is no tenderness. INC C D I    Lab, Imaging and other studies: I have personally reviewed pertinent reports.

## 2023-09-23 NOTE — PLAN OF CARE
Problem: PAIN - ADULT  Goal: Verbalizes/displays adequate comfort level or baseline comfort level  Description: Interventions:  - Encourage patient to monitor pain and request assistance  - Assess pain using appropriate pain scale  - Administer analgesics based on type and severity of pain and evaluate response  - Implement non-pharmacological measures as appropriate and evaluate response  - Consider cultural and social influences on pain and pain management  - Notify physician/advanced practitioner if interventions unsuccessful or patient reports new pain  Outcome: Progressing     Problem: INFECTION - ADULT  Goal: Absence or prevention of progression during hospitalization  Description: INTERVENTIONS:  - Assess and monitor for signs and symptoms of infection  - Monitor lab/diagnostic results  - Monitor all insertion sites, i.e. indwelling lines, tubes, and drains  - Monitor endotracheal if appropriate and nasal secretions for changes in amount and color  - [place] appropriate cooling/warming therapies per order  - Administer medications as ordered  - Instruct and encourage patient and family to use good hand hygiene technique    Outcome: Progressing  Goal: Absence of fever/infection during neutropenic period  Description: INTERVENTIONS:  - Monitor WBC    Outcome: Progressing     Problem: SAFETY ADULT  Goal: Patient will remain free of falls  Description: INTERVENTIONS:  - Educate patient/family on patient safety including physical limitations  - Instruct patient to call for assistance with activity   - Keep Call bell within reach  - Keep bed low and locked with side rails adjusted as appropriate  - Keep care items and personal belongings within reach  - Initiate and maintain comfort rounds  - Offer Toileting every in advance of need    Outcome: Progressing     Problem: DISCHARGE PLANNING  Goal: Discharge to home or other facility with appropriate resources  Description: INTERVENTIONS:  - Identify barriers to discharge w/patient and caregiver  - Arrange for needed discharge resources and transportation as appropriate  - Identify discharge learning needs (meds, wound care, etc.)  - Arrange for interpretive services to assist at discharge as needed  - Refer to Case Management Department for coordinating discharge planning if the patient needs post-hospital services based on physician/advanced practitioner order or complex needs related to functional status, cognitive ability, or social support system  Outcome: Progressing     Problem: Knowledge Deficit  Goal: Patient/family/caregiver demonstrates understanding of disease process, treatment plan, medications, and discharge instructions  Description: Complete learning assessment and assess knowledge base.   Interventions:  - Provide teaching at level of understanding  - Provide teaching via preferred learning methods  Outcome: Progressing     Problem: POSTPARTUM  Goal: Experiences normal postpartum course  Description: INTERVENTIONS:  - Monitor maternal vital signs  - Assess uterine involution and lochia  Outcome: Progressing  Goal: Appropriate maternal -  bonding  Description: INTERVENTIONS:  - Identify family support  - Assess for appropriate maternal/infant bonding   -Encourage maternal/infant bonding opportunities  - Referral to  or  as needed  Outcome: Progressing  Goal: Establishment of infant feeding pattern  Description: INTERVENTIONS:  - Assess breast/bottle feeding  - Refer to lactation as needed  Outcome: Progressing  Goal: Incision(s), wounds(s) or drain site(s) healing without S/S of infection  Description: INTERVENTIONS  - Assess and document dressing, incision, wound bed, drain sites and surrounding tissue  - Provide patient and family education  - Perform skin care/dressing changes every   Outcome: Progressing

## 2023-09-25 ENCOUNTER — TELEPHONE (OUTPATIENT)
Dept: OBGYN CLINIC | Facility: CLINIC | Age: 33
End: 2023-09-25

## 2023-09-25 NOTE — TELEPHONE ENCOUNTER
Left a message for Ms. Jules Parikh to call to schedule a BP and incision check in one week per Dr. Crowe [facility].

## 2023-09-25 NOTE — TELEPHONE ENCOUNTER
----- Message from Nathaniel Chahal MD sent at 9/23/2023  9:43 AM EDT -----  Need one week BP AND INCicsion check

## 2023-09-25 NOTE — TELEPHONE ENCOUNTER
----- Message from Lit Salinas MD sent at 9/23/2023  9:43 AM EDT -----  Need one week BP AND INCicsion check

## 2023-09-27 LAB — PLACENTA IN STORAGE: NORMAL

## 2023-09-28 NOTE — PROGRESS NOTES
Delivery date:    Vaginal or  :      AB  SAB  Terminations     Baby :    @   weeks   days :    Apgars:     and      Laceration :    Breast feeding:    Interested in discussing Birth control:    Any complication with delivery :    Breast Pain :    Abdominal Pain :    Vaginal Pain:    Vaginal Bleeding :    Any problem with having bowel movements:    Have you had intercourse since you delivered:    Depression score:

## 2023-09-29 ENCOUNTER — POSTPARTUM VISIT (OUTPATIENT)
Dept: OBGYN CLINIC | Facility: CLINIC | Age: 33
End: 2023-09-29

## 2023-09-29 VITALS
DIASTOLIC BLOOD PRESSURE: 70 MMHG | HEIGHT: 66 IN | SYSTOLIC BLOOD PRESSURE: 110 MMHG | BODY MASS INDEX: 32.95 KG/M2 | WEIGHT: 205 LBS

## 2023-09-29 DIAGNOSIS — Z48.89 ENCOUNTER FOR POST SURGICAL WOUND CHECK: ICD-10-CM

## 2023-09-29 DIAGNOSIS — Z01.30 BP CHECK: Primary | ICD-10-CM

## 2023-09-29 PROBLEM — O09.513 PRIMIGRAVIDA OF ADVANCED MATERNAL AGE IN THIRD TRIMESTER: Status: RESOLVED | Noted: 2023-07-31 | Resolved: 2023-09-29

## 2023-09-29 PROBLEM — O26.893 HEARTBURN DURING PREGNANCY IN THIRD TRIMESTER: Status: RESOLVED | Noted: 2023-03-23 | Resolved: 2023-09-29

## 2023-09-29 PROBLEM — R12 HEARTBURN DURING PREGNANCY IN THIRD TRIMESTER: Status: RESOLVED | Noted: 2023-03-23 | Resolved: 2023-09-29

## 2023-09-29 PROBLEM — Z3A.39 39 WEEKS GESTATION OF PREGNANCY: Status: RESOLVED | Noted: 2023-03-23 | Resolved: 2023-09-29

## 2023-09-29 PROBLEM — O99.213 OBESITY AFFECTING PREGNANCY IN THIRD TRIMESTER: Status: RESOLVED | Noted: 2023-03-23 | Resolved: 2023-09-29

## 2023-09-29 NOTE — PROGRESS NOTES
Diagnoses and all orders for this visit:    BP check    Encounter for post surgical wound check      BP normotensive   Incision CDI   PP exam in 2 weeks       Subjective    CC: Problem visit     Sailaja Tee is a 26 y.o. female   1 week incision check and BP check , pt is doing well, no concerns, breast feeding, baby doing well     Patient's last menstrual period was 2022 (approximate). No past medical history on file. Past Surgical History:   Procedure Laterality Date   • AUGMENTATION BREAST Bilateral    • MI  DELIVERY ONLY N/A 2023    Procedure:  SECTION ();   Surgeon: Shazia Sanchez MD;  Location: [facility] ;  Service: Obstetrics       Immunization History   Administered Date(s) Administered   • Tdap 2023       Family History   Problem Relation Age of Onset   • No Known Problems Mother    • No Known Problems Father    • No Known Problems Sister    • No Known Problems Brother    • Stroke Maternal Grandmother    • Cancer Maternal Grandfather    • Stroke Paternal Grandmother    • Cancer Paternal Grandfather      Social History     Tobacco Use   • Smoking status: Never   • Smokeless tobacco: Never   Vaping Use   • Vaping Use: Never used   Substance Use Topics   • Alcohol use: Not Currently   • Drug use: Never       Current Outpatient Medications:   •  acyclovir (ZOVIRAX) 5 % ointment, Apply topically every 4 (four) hours, Disp: 15 g, Rfl: 0  •  albuterol (2.5 mg/3 mL) 0.083 % nebulizer solution, Take 3 mL (2.5 mg total) by nebulization once as needed for shortness of breath for up to 1 dose, Disp: , Rfl: 0  •  docusate sodium (COLACE) 100 mg capsule, Take 1 capsule (100 mg total) by mouth 2 (two) times a day, Disp: , Rfl: 0  •  ibuprofen (MOTRIN) 600 mg tablet, Take 1 tablet (600 mg total) by mouth every 6 (six) hours, Disp: 30 tablet, Rfl: 0  •  Prenatal Vit-DSS-Fe Fum-FA (Prenatal 19) tablet, Take 1 tablet by mouth daily, Disp: , Rfl:   Patient Active Problem List    Diagnosis Date Noted   • Delivery by elective  section 2023   • Elevated blood-pressure reading, without diagnosis of hypertension 2023   • Desire for permanent contraception 2023       No Known Allergies    OB History    Para Term  AB Living   2 1 1   1 1   SAB IAB Ectopic Multiple Live Births   1     0 1      # Outcome Date GA Lbr Go/2nd Weight Sex Delivery Anes PTL Lv   2 Term 23 39w6d  2948 g (6 lb 8 oz) M CS-LTranv Spinal  JIN   1 SAB  4w0d              Vitals:    23 1457   BP: 110/70   BP Location: Left arm   Patient Position: Sitting   Cuff Size: Large   Weight: 93 kg (205 lb)   Height: 5' 6" (1.676 m)     Body mass index is 33.09 kg/m². Review of Systems     Constitutional: Negative for chills, fatigue, fever, headaches, visual disturbances, and unexpected weight change. Respiratory: Negative for cough, & shortness of breath. Cardiovascular: Negative for chest pain. .    Gastrointestinal: Negative for Abd pain, nausea & vomiting, constipation and diarrhea. Genitourinary: Negative for difficulty urinating, dysuria, hematuria, dyspareunia, unusual vaginal bleeding or discharge  Skin: Negative skin changes    Physical Exam     Constitutional: Alert & Oriented x3, well-developed and well-nourished. No distress. HENT: Atraumatic, Normocephalic,   Neck: Normal range of motion. Pulmonary: Effort normal.   Abdominal: Soft. No tenderness or masses.  C section incision CDI with no signs of infection, mild ecchymosis surrounding incision    Musculoskeletal: Normal ROM  Skin: Warm & Dry  Psychological: Normal mood, thought content, behavior & judgement

## 2023-10-11 NOTE — PROGRESS NOTES
Diagnoses and all orders for this visit:    Postpartum exam        May advance activities as tolerated  May resume sexual activity at your discretion   Perineal hygiene reviewed   Weight bearing exercises minium of 150 mins/weekly advised. Kegel exercises recommended. Reviewed  rest, daily exercise and nutrition recommendations. Continue with PNV. Gardisil vaccines recommended up to age 39  Advised to call with any issues,  all concerns & questions addressed. Follow up for annual exam in 3 months   See AVS for further educational information    Jennifer Tsang is a 26 y.o.  presents for routine postpartum visit. She is s/p PCS with tubal @ 39.6 on 23 . , Baby Boy, Apgar's   Prenatal and intrapartum course was complicated by   Patient Active Problem List   Diagnosis    Elevated blood-pressure reading, without diagnosis of hypertension     She denies abn bleeding, pelvic pain, breast complaints, bowel/bladder dysfunction, depression/anx. Denies resumption of sexual activity  Baby is thriving. combo feeding. EPDS score 0 Good support. Pt had tubal for contraception     Normal postpartum exam.   Surgical incision without S&S of infection     Gardasil:  Not completed,  Gardasil discussed pt aware of benefits/ risks. Aware she can start the series today or call for appt anytime     History reviewed. No pertinent past medical history. Past Surgical History:   Procedure Laterality Date    AUGMENTATION BREAST Bilateral     FL  DELIVERY ONLY N/A 2023    Procedure:  SECTION ();   Surgeon: Jayy Valdes MD;  Location: [facility] ;  Service: Obstetrics       Immunization History   Administered Date(s) Administered    Tdap 2023       Family History   Problem Relation Age of Onset    No Known Problems Mother     No Known Problems Father     No Known Problems Sister     No Known Problems Brother     Stroke Maternal Grandmother     Cancer Maternal Grandfather Stroke Paternal Grandmother     Cancer Paternal Grandfather      Social History     Tobacco Use    Smoking status: Never    Smokeless tobacco: Never   Vaping Use    Vaping Use: Never used   Substance Use Topics    Alcohol use: Not Currently    Drug use: Never         Current Outpatient Medications:     acyclovir (ZOVIRAX) 5 % ointment, Apply topically every 4 (four) hours, Disp: 15 g, Rfl: 0    albuterol (2.5 mg/3 mL) 0.083 % nebulizer solution, Take 3 mL (2.5 mg total) by nebulization once as needed for shortness of breath for up to 1 dose, Disp: , Rfl: 0    docusate sodium (COLACE) 100 mg capsule, Take 1 capsule (100 mg total) by mouth 2 (two) times a day, Disp: , Rfl: 0    ibuprofen (MOTRIN) 600 mg tablet, Take 1 tablet (600 mg total) by mouth every 6 (six) hours, Disp: 30 tablet, Rfl: 0    Prenatal Vit-DSS-Fe Fum-FA (Prenatal 19) tablet, Take 1 tablet by mouth daily, Disp: , Rfl:     Patient Active Problem List    Diagnosis Date Noted    Elevated blood-pressure reading, without diagnosis of hypertension 2023     No Known Allergies    OB History    Para Term  AB Living   2 1 1   1 1   SAB IAB Ectopic Multiple Live Births   1     0 1      # Outcome Date GA Lbr Go/2nd Weight Sex Delivery Anes PTL Lv   2 Term 23 39w6d  2948 g (6 lb 8 oz) M CS-LTranv Spinal  JIN   1 SAB  4w0d              Vitals:    10/13/23 1548   BP: 120/70   BP Location: Left arm   Patient Position: Sitting   Cuff Size: Large   Weight: 91.6 kg (202 lb)   Height: 5' 6" (1.676 m)     Body mass index is 32.6 kg/m². Review of Systems     Constitutional: Negative for chills, fatigue, fever, headaches, visual disturbances, and unexpected weight change. Respiratory: Negative for cough, & shortness of breath. Cardiovascular: Negative for chest pain. .    Gastrointestinal: Negative for Abd pain, nausea & vomiting, constipation and diarrhea.    Genitourinary: Negative for difficulty urinating, dysuria, hematuria, dyspareunia, unusual vaginal bleeding or discharge  Skin: Negative skin changes    Physical Exam     Constitutional: Alert & Oriented x3, well-developed and well-nourished. No distress. HENT: Atraumatic, Normocephalic, Conjunctivae clear  Neck: Normal range of motion. Pulmonary: Effort normal.   Abdominal: Soft. No tenderness or masses  Musculoskeletal: Normal ROM  Skin: Warm & Dry  Psychological: Normal mood, thought content, behavior & judgement     Breasts: Patient Reported   Right: tissue soft without  tenderness, skin changes  No areas of erythema or pain. Left:  tissue soft without  tenderness, skin changes  No areas of erythema or pain.      Pelvic exam deferred

## 2023-10-11 NOTE — PATIENT INSTRUCTIONS
AMBULATORY CARE:   What you need to know about a :  A , or  section, is abdominal surgery to deliver your baby. A  may also be done if you are pregnant with more than 1 baby. How to prepare for a :   Your obstetrician will talk to you about how to prepare if you are having a planned . He or she may tell you not to eat or drink anything after midnight on the day of your surgery. He or she will tell you what medicines to take or not take on the day of your surgery. Tell your healthcare provider if you had an allergic reaction to anesthesia or antibiotics. Antibiotics may be given an hour before surgery or right after it starts. Antibiotics help fight or prevent a bacterial infection. You may need tests for certain infections that can be passed to your baby, such as group B strep (GBS). If you are a GBS carrier or at increased risk, antibiotics help prevent your baby from being infected during the . What will happen during a :  You will usually be given spinal anesthesia to numb you from the surgery area down. You may still feel pressure or pushing during the , but you should not feel any pain. Your obstetrician will usually make an incision across your lower abdomen. He or she will gently pull your baby or babies out. Your incision will be closed with stitches or staples and covered with a bandage. What will happen after a :   You will be taken to a room to rest for about an hour after you deliver. Do not get out of bed until healthcare providers say it is okay. Call for a healthcare provider if you are holding your baby and start to feel tired. The provider can put him or her in a bassinet near you while you rest or sleep. This will help prevent an accidental drop or fall of your baby. Risks of a :  You may bleed more than expected or develop an infection.  Your bladder or intestines may be injured during the procedure. You may get a blood clot in your leg. This may become life-threatening. Call your local emergency number (911 in the [address]) if:   You feel lightheaded, short of breath, and have chest pain. You cough up blood. Call your obstetrician if:   Blood soaks through your bandage. Your stitches come apart. Your arm or leg feels warm, tender, and painful. It may look swollen and red. You have heavy vaginal bleeding that fills 1 or more sanitary pads in 1 hour. You have a fever. Your incision is swollen, red, or draining pus. You have questions or concerns about yourself or your baby. Medicines: You may  need any of the following:  Prescription pain medicine  may be given. Ask your healthcare provider how to take this medicine safely. Some prescription pain medicines contain acetaminophen. Do not take other medicines that contain acetaminophen without talking to your healthcare provider. Too much acetaminophen may cause liver damage. Prescription pain medicine may cause constipation. Ask your healthcare provider how to prevent or treat constipation. Acetaminophen  decreases pain and fever. It is available without a doctor's order. Ask how much to take and how often to take it. Follow directions. Read the labels of all other medicines you are using to see if they also contain acetaminophen, or ask your doctor or pharmacist. Acetaminophen can cause liver damage if not taken correctly. NSAIDs , such as ibuprofen, help decrease swelling, pain, and fever. NSAIDs can cause stomach bleeding or kidney problems in certain people. If you take blood thinner medicine, always ask your healthcare provider if NSAIDs are safe for you. Always read the medicine label and follow directions. Wound care:  Carefully wash your incision wound with soap and water every day. Keep your wound clean and dry. Wear loose, comfortable clothes that do not rub against your wound.  Ask about bathing and showering. Limit activity as directed: Ask when it is safe for you to drive, walk up stairs, lift heavy objects, and have sex. Ask when it is okay to exercise, and what types of exercise to do. Start slowly and do more as you get stronger. Drink liquids as directed:  Liquids help keep you hydrated after your procedure and decrease your risk for a blood clot. Ask how much liquid to drink each day and which liquids are best for you. Follow up with your obstetrician as directed: You may need to return to have your stitches or staples removed. Write down your questions so you remember to ask them during your visits. ©  Abhijeet Rojo  Information is for End User's use only and may not be sold, redistributed or otherwise used for commercial purposes. The above information is an  only. It is not intended as medical advice for individual conditions or treatments. Talk to your doctor, nurse or pharmacist before following any medical regimen to see if it is safe and effective for you. Postpartum Bleeding   AMBULATORY CARE:   What you need to know about postpartum bleeding:  Postpartum bleeding is vaginal bleeding after childbirth. This bleeding is normal, whether your baby was born vaginally or by . It contains blood and the tissue that lined the inside of your uterus when you were pregnant. What to expect with postpartum bleeding:  Postpartum bleeding usually lasts at least 10 days, and may last longer than 6 weeks. Your bleeding may range from light (barely staining a pad) to heavy (soaking a pad in 1 hour). Usually, you have heavier bleeding right after childbirth, which slows over the next few weeks until it stops. The bleeding is red or dark brown with clots for the first 1 to 3 days. It then turns pink for several days, and then becomes a white or yellow discharge until it ends.   Call your local emergency number (917 in the [address]) if:   You are suddenly short of breath and feel lightheaded. You have sudden chest pain. You are breathing faster than normal.    Your heart is beating faster than normal.    Call your doctor or obstetrician if:   Your bleeding increases, or you have heavy bleeding that soaks a pad in 1 hour for 2 hours in a row. You have a fever. You pass large blood clots. You feel dizzy. You have a low back ache, abdominal pain or tenderness, or loss of appetite. You are urinating less than usual, or not at all. You have questions or concerns about your condition or care. What to expect with postpartum bleeding:  Postpartum bleeding usually lasts at least 10 days, and may last longer than 6 weeks. Your bleeding may range from light (barely staining a pad) to heavy (soaking a pad in 1 hour). Usually, you have heavier bleeding right after childbirth, which slows over the next few weeks until it stops. The bleeding is red or dark brown with clots for the first 1 to 3 days. It then turns pink for several days, and then becomes a white or yellow discharge until it ends. Follow up with your obstetrician as directed:  Do not have sex until your obstetrician says it is okay. Write down your questions so you remember to ask them during your visits. © Copyright Demi Fent 2023 Information is for End User's use only and may not be sold, redistributed or otherwise used for commercial purposes. The above information is an  only. It is not intended as medical advice for individual conditions or treatments. Talk to your doctor, nurse or pharmacist before following any medical regimen to see if it is safe and effective for you. Postpartum Depression   AMBULATORY CARE:   Postpartum depression (PPD)  is a mood disorder that causes feelings of sadness or hopelessness that do not go away. PPD occurs after your baby is born. Your symptoms may last up to 12 months after delivery.  Your symptoms may become severe and affect your daily activities and relationships. Common signs and symptoms:   Feeling sad, anxious, tearful, discouraged, hopeless, or alone    Thoughts that you are not a good mother    Trouble completing daily tasks, concentrating, or remembering things    Lack of appetite    Lack of interest in your baby    Feeling restless, irritable, or withdrawn    An overwhelmed feeling with your new baby and a belief that it will not get better    Feeling unimportant or guilty most of the time    Trouble sleeping, even after the baby is asleep    Call your local emergency number (911 in the [address]) if:   You think about hurting yourself or your baby. Call your doctor if:   Your symptoms get worse or do not get better with treatment. Your depression keeps you from doing your regular daily activities. You have new symptoms since your last visit. You have questions or concerns about your condition or care. Treatment  may include any of the following:  Talk therapy  is used to help you cope with your feelings. Talk therapy can be with a therapist, counselor, or psychiatrist. Sessions may be done alone or with family. Antidepressant medicine  may be given to decrease or manage symptoms. You may need to take this medicine for several weeks before they start working. Tell your provider about any side effects or problems you have with your medicine. The type or amount of medicine may need to be changed. Self-care: It may take a few weeks to feel better. Be patient with yourself. The following can help you manage your symptoms:  Rest as needed. Take a nap or rest while your baby sleeps. Ask someone to watch your baby while you nap. Get emotional support. Share your feelings with your partner, a friend, or another mother. Ask your partner, friends, or family to help with cooking or cleaning. Do only what is needed and let other things wait until later. Exercise when you can.   Even 5 or 10 minutes of exercise can help improve your mood. Walk around the block or do some stretching exercises. Eat a variety of healthy foods. Healthy foods include fruits, vegetables, whole-grain breads, low-fat dairy products, beans, lean meats, and fish. Do not skip meals. Tell your healthcare provider if you have no appetite or cannot eat. Your provider can help you create a healthy meal plan. Care for yourself. Shower and dress yourself each day. Do things you enjoy. Celebrate small achievements, even if it is only 1 thing a day. Try to get out of the house for a short time each day. Meet a friend for coffee. Follow up with your doctor as directed: Your healthcare provider will monitor your progress at follow-up visits. Your provider will also monitor your medicine if you take antidepressants and ask if the medicine is helping. Tell your provider about any side effects or problems you have with your medicine. The type or amount of medicine may need to be changed. Write down your questions so you remember to ask them during your visits. © Copyright Jane Sin 2023 Information is for End User's use only and may not be sold, redistributed or otherwise used for commercial purposes. The above information is an  only. It is not intended as medical advice for individual conditions or treatments. Talk to your doctor, nurse or pharmacist before following any medical regimen to see if it is safe and effective for you. Breastfeeding and Breast Engorgement   AMBULATORY CARE:   What you need to know about breast engorgement:  Breast engorgement develops when too much milk builds up in your breast. It is normal for your breasts to feel swollen, heavy, and tender when your milk comes in. This is called breast fullness. When your breast starts to feel painful and hard, the fullness has developed into engorgement. Breast engorgement usually happens 3 to 5 days after you give birth.  Engorgement can happen if you are not breastfeeding or expressing milk often, or produce a lot of milk. Your baby may have a hard time latching on (attaching) to your breast to feed. Without treatment, engorgement can lead to plugged milk ducts or a breast infection called mastitis. Common symptoms include the following: A swollen, tender breast    A breast that feels hard to the touch or looks tight or shiny    Warm, red, or throbbing breast    Flat nipple    A low fever    Seek care immediately if:   You have a fever with chills or body aches. You have pain and swelling in one or both breasts that keeps you from breastfeeding. Contact your healthcare provider if:   You have a tender breast lump that grows slowly and usually forms on one side of your breast.    You have a small, white bump on your nipple. Your symptoms do not get better within 24 hours. You have questions or concerns about your condition or care. Manage your symptoms:   Breastfeed or pump every 2 or 3 hours. Frequent breastfeeding helps decrease engorgement discomfort. Express or pump milk from your breasts before you breastfeed. This will help soften your breast and your nipple, and allow your baby to latch on better. Massage your breast.  Breast massage helps empty your engorged breast and decrease pain. Gently massage your breast before and during breastfeeding to help increase your milk flow. Gently stroke your breast, starting from the outer areas and working your way toward the nipple. Breast massage may also help prevent breast engorgement if done in the first few days after you give birth. Apply a cool compress in between feedings. The cold may help decrease swelling and pain in your engorged breast. Wet a washcloth in cold water, wring it out, and place it on your breast. Ask how long and how often to use a cool compress. Wear a supportive bra. The bra should fit well but not be too tight. Prevent breast engorgement:   Help your baby get a good latch. Hold the nape of his or her neck to help him or her latch onto your breast. Touch his or her top lip with your nipple and wait for him or her to open his or her mouth wide. Your baby's lower lip and chin should touch the areola (dark area around the nipple) first. Help him or her get as much of the areola in his or her mouth as possible. You should feel as if your baby will not separate from your breast easily. Gently break suction and reposition if your baby is only sucking on the nipple. Talk to a lactation consultant if you need help with your baby's latch. Empty your breasts completely. Take your time when you breastfeed to allow your baby to empty your breast. Try not to switch breasts too early. Express or pump after you breastfeed if your baby is not emptying your breasts when he or she feeds. Apply warmth to your breast before you breastfeed. Put a warm, wet cloth on your breast or take a warm shower. This can help increase your milk flow. Follow up with your doctor as directed:  Write down your questions so you remember to ask them during your visits. For more information:   [employer] [address] [facility] [facility] , [address] [place] [address]  Phone: [phone]  Web Address: http://[email or web address]/    [facility] [address]  Phone: [phone]  Phone: [phone]  Web Address: http://www.ennis.ulises/. org  © Copyright [facility] 2023 Information is for End User's use only and may not be sold, redistributed or otherwise used for commercial purposes. The above information is an  only. It is not intended as medical advice for individual conditions or treatments. Talk to your doctor, nurse or pharmacist before following any medical regimen to see if it is safe and effective for you. Mastitis   AMBULATORY CARE:   Mastitis  is an infection of breast tissue that most often occurs in women who breastfeed.  It can happen any time during breastfeeding, but usually occurs within the first 3 months after giving birth. Usually only one breast is affected. Common signs and symptoms include the following:   Chills and fever    Breast swelling, redness, warmth, and tenderness     Tenderness under your arm    Fatigue and body aches    Contact your healthcare provider if:   Your symptoms do not get better within 2 days. You have a painful lump in your breast.     You have swollen and tender lymph nodes in your armpit on the same side as the affected breast.    You have questions or concerns about your condition or care. Treatment:  You can continue to breastfeed your baby while you are being treated for mastitis. Breastfeeding when you have mastitis may help speed your recovery. You may need any of the following:  Antibiotics  help treat or prevent a bacterial infection. Acetaminophen  decreases pain and fever. It is available without a doctor's order. Ask how much to take and how often to take it. Follow directions. Acetaminophen can cause liver damage if not taken correctly. NSAIDs , such as ibuprofen, help decrease swelling, pain, and fever. This medicine is available with or without a doctor's order. NSAIDs can cause stomach bleeding or kidney problems in certain people. If you take blood thinner medicine, always ask your healthcare provider if NSAIDs are safe for you. Always read the medicine label and follow directions. Incision and drainage  may be needed if the swelling does not go away and an abscess forms. Your healthcare provider will make a small incision in your breast to drain the pus. Manage your symptoms:   Continue to breastfeed from the affected breast.  This will help to prevent an abscess from forming. Breastfeed your baby on the affected side first. Apply a warm, wet cloth on your breast or take a warm shower before you feed your baby. This can help increase your milk flow.  If it is painful when you breastfeed from the affected breast, feed your baby from the other breast first. Pump the affected side to completely drain your breast after breastfeeding, if needed. You may save the pumped milk to feed your baby. Use different positions to breastfeed. Change the position of your baby during feedings. This may help to relieve your discomfort. Apply heat on your breast for 20 to 30 minutes every 2 hours for as many days as directed. Heat helps decrease pain. Apply ice after feedings. Apply ice on your breast for 15 to 20 minutes every hour or as directed. Use an ice pack, or put crushed ice in a plastic bag. Cover it with a towel. Ice helps prevent tissue damage and decreases swelling and pain. Massage your breast.  Gently massage your breast before and during breastfeeding to help drain your milk. Drink liquids as directed. Ask how much liquid to drink each day and which liquids are best for you. Rest as needed. Do not sleep on your stomach until your infection is gone. Prevent mastitis:   Breastfeed every 2 or 3 hours to prevent engorgement. Breast engorgement develops when too much milk builds up in your breast. Take your time when you breastfeed to allow your baby to empty your breast. Try not to switch breasts too early. Express or pump after you breastfeed if your baby is not emptying your breasts when he or she feeds. Prevent sore and cracked nipples. A good latch prevents sore and cracked nipples. If you have sore nipples after breastfeeding, your baby may not be latched on properly. Gently break suction and reposition if your baby is only sucking on the nipple. Talk to a lactation consultant if you need help with your baby's latch. Care for your breasts. Keep your nipples clean and dry between feedings. Check them for cracks, blisters, or other irritated areas. Ask a lactation specialist or your healthcare provider how to treat sore and cracked nipples.  Wash your hands before and after you breastfeed your baby or pump your breasts. Wear a comfortable nursing bra that supports your breasts but is not too tight. Follow up with your doctor as directed:  Write down your questions so you remember to ask them during your visits. © Copyright She Hogue 2023 Information is for End User's use only and may not be sold, redistributed or otherwise used for commercial purposes. The above information is an  only. It is not intended as medical advice for individual conditions or treatments. Talk to your doctor, nurse or pharmacist before following any medical regimen to see if it is safe and effective for you. Caring for Your Baby   WHAT YOU NEED TO KNOW:   Care for your baby includes keeping him or her safe, clean, and comfortable. Your baby will cry or make noises to let you know when he or she needs something. You will learn to tell what your baby needs by the way he or she cries. Your baby will move in certain ways when he or she needs something, such as sucking on a fist when hungry. DISCHARGE INSTRUCTIONS:   Call your local emergency number (911 in the [address]) if:   You feel like hurting your baby. Call your baby's pediatrician if:   Your baby's abdomen is hard and swollen, even when he or she is calm and resting. You feel depressed and cannot take care of your baby. Your baby's lips or mouth are blue and he or she is breathing faster than usual.    Your baby's armpit temperature is higher than 99°F (37.2°C). Your baby's eyes are red, swollen, or draining yellow pus. Your baby coughs often during the day, or chokes during each feeding. Your baby does not want to eat. Your baby cries more than usual and you cannot calm him or her down. Your baby's skin turns yellow or he or she has a rash. You have questions or concerns about caring for your baby.     What to feed your baby:   Breast milk is the only food your baby needs for the first 6 months of life. If possible, only breastfeed (no formula) him or her for the first 6 months. Breastfeeding is recommended for at least the first year of your baby's life, even when he or she starts eating food. You may pump your breasts and feed breast milk from a bottle. You may feed your baby formula from a bottle if breastfeeding is not possible. Talk to your baby's pediatrician about the best formula for your baby. He or she can help you choose one that contains iron. Do not add cereal to the milk or formula. Your baby may get too many calories during a feeding. You can make more if your baby is still hungry after he or she finishes a bottle. How much to feed your baby: Your baby may want different amounts each day. The amount of formula or breast milk your baby drinks may change with each feeding and each day. The amount your baby drinks depends on his or her weight, how fast he or she is growing, and how hungry he or she is. Your baby may want to drink a lot one day and not want to drink much the next. Do not overfeed your baby. Overfeeding means your baby gets too many calories during a feeding. This may cause him or her to gain weight too fast. Your baby may also continue to overeat later in life. Look for signs that your baby is done feeding. Your baby may look around instead of watching you. He or she may chew on the nipple of the bottle rather than suck on it. He or she may also cry and try to wriggle away from the bottle or out of the high chair. Feed your baby each time he or she is hungry:      Babies up to 2 months old  will drink about 2 to 4 ounces at each feeding. He or she will probably want to drink every 3 to 4 hours. Wake your baby to feed him or her if he or she sleeps longer than 4 to 5 hours. Babies 2 to 10 months old  should drink 4 to 5 bottles each day. He or she will drink 4 to 6 ounces at each feeding.  When your baby is 2 to 1 months old, he or she may begin to sleep through the night. When this happens, you may stop waking up to give your baby formula or breast milk in the night. If you are giving your baby breast milk, you may still need to wake up to pump your breasts. Store the milk for your baby to drink at a later time. Babies 6 to 13 months old  should drink 3 to 5 bottles every day. He or she may drink up to 8 ounces at each feeding. You may increase the time between feedings if your baby is not hungry. You may also start to feed your baby foods at 6 months. Ask your child's pediatrician for more information about the right foods to feed your baby. How to help your baby latch on correctly for breastfeeding:  Help your baby move his or her head to reach your breast. Hold the nape of his or her neck to help him or her latch onto your breast. Touch his or her top lip with your nipple and wait for him or her to open his or her mouth wide. Your baby's lower lip and chin should touch the areola (dark area around the nipple) first. Help him or her get as much of the areola in his or her mouth as possible. You should feel as if your baby will not separate from your breast easily. A correct latch helps your baby get the right amount of milk at each feeding. Allow your baby to breastfeed for as long as he or she is able. Signs of correct latch-on:   You can hear your baby swallow. Your baby is relaxed and takes slow, deep mouthfuls. Your breast or nipple does not hurt during breastfeeding. Your baby is able to suckle milk right away after he or she latches on. Your nipple is the same shape when your baby is done breastfeeding. Your breast is smooth, with no wrinkles or dimples where your baby is latched on. Feed your baby safely:   Hold your baby upright to feed him or her. Do not prop your baby's bottle. Your baby could choke while you are not watching, especially in a moving vehicle. Do not use a microwave to heat your baby's bottle. The milk or formula will not heat evenly and will have spots that are very hot. Your baby's face or mouth could be burned. You can warm the milk or formula quickly by placing the bottle in a pot of warm water for a few minutes. How to burp your baby:  Oscar Pinonos your baby when you switch breasts or after every 2 to 3 ounces from a bottle. Burp him or her again when he or she is finished eating. Your baby may spit up when he or she burps. This is normal. Hold your baby in any of the following positions to help him or her burp:  Hold your baby against your chest or shoulder. Support his or her bottom with one hand. Use your other hand to pat or rub his or her back gently. Sit your baby upright on your lap. Use one hand to support his or her chest and head. Use the other hand to pat or rub his or her back. Place your baby across your lap. He or she should face down with his or her head, chest, and belly resting on your lap. Hold him or her securely with one hand and use your other hand to rub or pat his or her back. How to change your baby's diaper:  Never leave your baby alone when you change his or her diaper. If you need to leave the room, put the diaper back on and take your baby with you. Wash your hands before and after you change your baby's diaper. Put a blanket or changing pad on a safe surface. Milena Bi your baby down on the blanket or pad. Remove the dirty diaper and clean your baby's bottom. If your baby had a bowel movement, use the diaper to wipe off most of the bowel movement. Clean your baby's bottom with a wet washcloth or diaper wipe. Do not use diaper wipes if your baby has a rash or circumcision that has not yet healed. Gently lift both legs and wash the buttocks. Always wipe from front to back. Clean under all skin folds and between creases. Apply ointment or petroleum jelly as directed if your baby has a rash. Put on a clean diaper.   Lift both your baby's legs and slide the clean diaper beneath his or her buttocks. Gently direct your baby boy's penis down as the diaper is put on. Fold the diaper down if your baby's umbilical cord has not fallen off. How to care for your baby's skin:  Sponge bathe your baby with warm water and a cleanser made for a baby's skin. Do not use baby oil, creams, or ointments. These may irritate your baby's skin or make skin problems worse. Ask for more information on sponge bathing your baby. Fontanelles  (soft spots) on your baby's head are usually flat. They may bulge when your baby cries or strains. It is normal to see and feel a pulse beating under a soft spot. It is okay to touch and wash your baby's soft spots. Skin peeling  is common in babies who are born after their due date. Peeling does not mean that your baby's skin is too dry. You do not need to put lotions or oils on your 's skin to stop the peeling or to treat rashes. Bumps, a rash, or acne  may appear about 3 days to 5 weeks after birth. Bumps may be white or yellow. Your baby's cheeks may feel rough and may be covered with a red, oily rash. Do not squeeze or scrub the skin. When your baby is 1 to 2 months old, his or her skin pores will begin to naturally open. When this happens, the skin problems will go away. A lip callus (thickened skin)  may form on your baby's upper lip during the first month. It is caused by sucking and should go away within the first year. This callus does not bother your baby, so you do not need to remove it. How to clean your baby's ears and nose:   Use a wet washcloth or cotton ball  to clean the outer part of your baby's ears. Do not put cotton swabs into your baby's ears. These can hurt his or her ears and push earwax in. Earwax should come out of your baby's ear on its own. Talk to your baby's pediatrician if you think your baby has too much earwax. Use a rubber bulb syringe  to suction your baby's nose if he or she is stuffed up.  Point the bulb syringe away from his or her face and squeeze the bulb to create a vacuum. Gently put the tip into one of your baby's nostrils. Close the other nostril with your fingers. Release the bulb so that it sucks out the mucus. Repeat if necessary. Boil the syringe for 10 minutes after each use. Do not put your fingers or cotton swabs into your baby's nose. How to care for your baby's eyes:  A  baby's eyes usually make just enough tears to keep his or her eyes wet. By 7 to 7 months old, your baby's eyes will develop so they can make more tears. Tears drain into small ducts at the inside corners of each eye. A blocked tear duct is common in newborns. A possible sign of a blocked tear duct is a yellow sticky discharge in one or both of your baby's eyes. Your baby's pediatrician may show you how to massage your baby's tear ducts to unplug them. How to care for your baby's fingernails and toenails:  Your baby's fingernails are soft, and they grow quickly. You may need to trim them with baby nail clippers 1 or 2 times each week. Be careful not to cut too closely to the skin because you may cut the skin and cause bleeding. It may be easier to cut your baby's fingernails when he or she is asleep. Your baby's toenails may grow much slower. They may be soft and deeply set into each toe. You will not need to trim them as often. How to care for your baby's umbilical cord stump:  Your baby's umbilical cord stump will dry and fall off in about 7 to 21 days, leaving a belly button. If your baby's stump gets dirty from urine or bowel movement, wash it off right away with water. Gently pat the stump dry. This will help prevent infection around your baby's cord stump. Fold the front of the diaper down below the cord stump to let it air dry. Do not cover or pull at the cord stump. How to care for your baby boy's circumcision:  Your baby's penis may have a plastic ring that will come off within 8 days.  His penis may be covered with gauze and petroleum jelly. Keep your baby's penis as clean as possible. Clean it with warm water only. Gently blot or squeeze the water from a wet cloth or cotton ball onto the penis. Do not use soap or diaper wipes to clean the circumcision area. This could sting or irritate your baby's penis. Your baby's penis should heal in about 7 to 10 days. What to do when your baby cries:  Your baby may cry because he or she is hungry. He or she may have a wet diaper, or be hot or cold. He or she may cry for no reason you can find. It can be hard to listen to your baby cry and not be able to calm him or her down. Ask for help and take a break if you feel stressed or overwhelmed. Never shake your baby to try to stop his or her crying. This can cause blindness or brain damage. The following may help comfort your baby:  Hold your baby skin to skin and rock him or her, or swaddle him or her in a soft blanket. Gently pat your baby's back or chest. Stroke or rub his or her head. Quietly sing or talk to your baby, or play soft, soothing music. Put your baby in his or her car seat and take him or her for a drive, or go for a stroller ride. Burp your baby to get rid of extra gas. Give your baby a soothing, warm bath. How to keep your baby safe when he or she sleeps:   Always lay your baby on his or her back to sleep. This position can help reduce your baby's risk for sudden infant death syndrome (SIDS). Keep the room at a temperature that is comfortable for an adult. Do not let the room get too hot or cold. Use a crib or bassinet that has firm sides. Do not let your baby sleep on a soft surface such as a waterbed or couch. He or she could suffocate if his or her face gets caught in a soft surface. Use a firm, flat mattress. Cover the mattress with a fitted sheet that is made especially for the type of mattress you are using.     Remove all objects, such as toys, pillows, or blankets, from your baby's bed while he or she sleeps. Ask for more information on childproofing. How to keep your baby safe in the car: Always buckle your baby into a child safety seat. A child safety seat is a padded seat that secures infants and children while they ride in a car. Every child safety seat has age, height, and weight ranges. Keep using the safety seat until your child reaches the maximum of the range. Then he or she is ready for the child safety seat that is the next size up. Only use child safety seats. Do not use a toy chair or prop your child on books or other objects. Make sure you have a safety seat that meets safety standards. Place your child safety seat in the middle of the back seat. The safety seat should not move more than 1 inch in any direction after you secure it. Always follow the instructions provided to help you position the safety seat. The instructions will also guide you on how to secure your child properly. Make sure the child safety seat has a harness and clip. The harness is made of straps that go over your child's shoulders. The straps connect to a buckle that rests over your child's abdomen. These straps keep your child in the seat during an accident. Another strap comes up from the bottom of the seat and connects to the buckle between your child's legs. This strap keeps your child from slipping out of the seat. Slide the clip up and down the shoulder straps to make them tighter or looser. You should be able to slip a finger between your child and the strap. Follow up with your baby's pediatrician as directed:  Write down your questions so you remember to ask them during your visits. © Copyright Manoj Abts 2023 Information is for End User's use only and may not be sold, redistributed or otherwise used for commercial purposes. The above information is an  only. It is not intended as medical advice for individual conditions or treatments.  Talk to your doctor, nurse or pharmacist before following any medical regimen to see if it is safe and effective for you. Self Care After Delivery   AMBULATORY CARE:   The postpartum period  is the period of time from delivery to about 6 weeks. During this time you may experience many physical and emotional changes. It is important to understand what is normal and when you need to call your healthcare provider. It is also important to know how to care for yourself during this time. Call your local emergency number (911 in the [address]) for any of the following: You see or hear things that are not there, or have thoughts of harming yourself or your baby. You soak through 1 pad in 15 minutes, have blurry vision, clammy or pale skin, and feel faint. You faint or lose consciousness. You have trouble breathing. You cough up blood. Your  incision comes apart. Seek care immediately if:   Your heart is beating faster than usual.    You have a bad headache or changes in your vision. Your episiotomy or  incision is red, swollen, bleeding, or draining pus. You have severe abdominal pain. Call your doctor or obstetrician if:   Your leg is painful, red, and larger than usual.    You soak through 1 or more pads in an hour, or pass blood clots larger than a quarter from your vagina. You have a fever. You have new or worsening pain in your abdomen or vagina. You continue to have depression 1 to 2 weeks after you deliver. You have trouble sleeping. You have foul-smelling discharge from your vagina. You have pain or burning when you urinate. You do not have a bowel movement for 3 days or more. You have nausea or are vomiting. You have hard lumps or red streaks over your breasts. You have cracked nipples or bleed from your nipples. You have questions or concerns about your condition or care. Physical changes:   The following are normal changes after you give birth:  Pain in the area between your anus and vagina    Breast pain    Constipation or hemorrhoids    Hot or cold flashes    Vaginal bleeding or discharge    Mild to moderate abdominal cramping    Difficulty controlling bowel movements or urine    Emotional changes:  A drop in hormone levels after you deliver may cause changes in your emotions. You may feel irritable, sad, or anxious. You may cry easily or for no reason. You may also feel depressed. Depression that continues can be a sign of postpartum depression, a condition that can be treated. Treatment may include talk therapy, medicines, or both. Healthcare providers will ask how you are feeling and if you have any depression. These talks can happen during appointments for your medical care and for your baby's care, such as well child visits. Providers can help you find ways to care for yourself and your baby. Talk to your providers about the following:  When emotional changes or depression started, and if it is getting worse over time    Problems you are having with daily activities, sleep, or caring for your baby    If anything makes you feel worse, or makes you feel better    Feeling that you are not bonding with your baby the way you want    Any problems your baby has with sleeping or feeding    Your baby is fussy or cries a lot    Support you have from friends, family, or others    Breast care for breastfeeding mothers: You may have sore breasts for 3 to 6 days after you give birth. This happens as your milk begins to fill your breasts. You may also have sore breasts if you do not breastfeed frequently. Do the following to care for your breasts:  Apply a moist, warm, compress to your breast as directed. This may help soothe your breasts. Make sure the washcloth is not too hot before you apply it to your breast.    Nurse your baby or pump your milk frequently. This may prevent clogged milk ducts.  Ask your healthcare provider how often to nurse or pump.    Massage your breasts as directed. This may help increase your milk flow. Gently rub your breasts in a circular motion before you breastfeed. You may need to gently squeeze your breast or nipple to help release milk. You can also use a breast pump to help release milk from your breast.    Wash your breasts with warm water only. Do not put soap on your nipples. Soap may cause your nipples to become dry. Apply lanolin cream to your nipples as directed. Lanolin cream may add moisture to your skin and prevent nipple dryness. Always  wash off lanolin cream with warm water before you breastfeed. Place pads in your bra. Your nipples may leak milk when you are not breastfeeding. You can place pads inside of your bra to help prevent leaking onto your clothing. Ask your healthcare provider where to purchase bra pads. Get breastfeeding support if needed. Healthcare providers can answer questions about breastfeeding and provide you with support. Ask your healthcare provider who you can contact if you need breastfeeding support. Breast care for non-breastfeeding mothers:  Milk will fill your breasts even if you bottle feed your baby. Do the following to help stop your milk from filling your breasts and causing pain:  Wear a bra with support at all times. A sports bra or a tight-fitting bra will help stop your milk from coming in. Apply ice on each breast for 15 to 20 minutes every hour or as directed. Use an ice pack, or put crushed ice in a plastic bag. Cover it with a towel before you apply it to your breast. Ice helps your milk ducts shrink. Keep your breasts away from warm water. Warm water will make it easier for milk to fill your breasts. Stand with your breasts away from warm water in the shower. Limit how much you touch your breasts. This will prevent them from filling with milk. Perineum care: Your perineum is the area between your rectum and vagina.  It is normal to have swelling and pain in this area after you give birth. If you had an episiotomy, your healthcare provider may give you special instructions. Clean your perineum after you use the bathroom. This may prevent infection and help with healing. Use a spray bottle with warm water to clean your perineum. You may also gently spray warm water against your perineum when you urinate. Always wipe front to back. Take a sitz bath as directed. A sitz bath may help relieve swelling and pain. Fill your bath tub or bucket with water up to your hips and sit in the water. Use cold water for 2 days after you deliver. Then use warm water. Ask your healthcare provider for more information about a sitz bath. Apply ice packs for the first 24 hours or as directed. Use a plastic glove filled with ice or buy an ice pack. Wrap the ice pack or plastic glove in a small towel or wash cloth. Place the ice pack on your perineum for 20 minutes at a time. Sit on a donut-shaped pillow. This may relieve pressure on your perineum when you sit. Use wipes that contain medicine or take pills as directed. Your healthcare provider may tell you to use witch hazel pads. You can place witch hazel pads in the refrigerator before you apply them to your perineum. Your provider may also tell you to take NSAIDs. Ask him or her how often to take pills or use the wipes. Do not go swimming or take tub baths for 4 to 6 weeks or as directed. This will help prevent an infection in your vagina or uterus. Bowel and bladder care: It may take 3 to 5 days to have a bowel movement after you deliver your baby. You can do the following to prevent or manage constipation, and get control of your bowel or bladder:  Take stool softeners as directed. A stool softener is medicine that will make your bowel movements softer. This may prevent or relieve constipation. A stool softener may also make bowel movements less painful. Drink plenty of liquids.   Ask how much liquid to drink each day and which liquids are best for you. Liquids may help prevent constipation. Eat foods high in fiber. Examples include fruits, vegetables, grains, beans, and lentils. Ask your healthcare provider how much fiber you need each day. Fiber may prevent constipation. Do Kegel exercises as directed. Kegel exercises will help strengthen the muscles that control bowel movements and urination. Ask your healthcare provider for more information on Kegel exercises. Apply cold compresses or medicine to hemorrhoids as directed. This may relieve swelling and pain. Your healthcare provider may tell you to apply ice or wipes that contain medicine to your hemorrhoids. He or she may also tell you to use a sitz bath. Ask your provider for more information on how to manage hemorrhoids. Nutrition:  Good nutrition is important in the postpartum period. It will help you return to a healthy weight, increase your energy levels, and prevent constipation. It will also help you get enough nutrients and calories if you are going to breastfeed your baby. Eat a variety of healthy foods. Healthy foods include fruits, vegetables, whole-grain breads, low-fat dairy products, beans, lean meats, and fish. You may need 500 to 700 extra calories each day if you breastfeed your baby. You may also need extra protein. Limit foods with added sugar and high amounts of fat. These foods are high in calories and low in healthy nutrients. Read food labels so you know how much sugar and fat is in the food you want to eat. Drink 8 to 10 glasses of water per day. Water will help you make plenty of milk for your baby. It will also help prevent constipation. Drink a glass of water every time you breastfeed your baby. Take vitamins as directed. Ask your healthcare provider what vitamins you need. Limit caffeine and alcohol if you are breastfeeding. Caffeine and alcohol can get into your breast milk. Caffeine and alcohol can make your baby fussy. They can also interfere with your baby's sleep. Ask your healthcare provider if you can drink alcohol or caffeine. Rest and sleep: You may feel very tired in the postpartum period. Enough sleep will help you heal and give you energy to care for your baby. The following may help you get sleep and rest:  Nap when your baby naps. Your baby may nap several times during the day. Get rest during this time. Limit visitors. Many people may want to see you and your baby. Ask friends or family to visit on different days. This will give you time to rest.    Do not plan too much for one day. Put off household chores so that you have time to rest. Gradually do more each day. Ask for help from family, friends, or neighbors. Ask them to help you with laundry, cleaning, or errands. Also ask someone to watch the baby while you take a nap or relax. Ask your partner to help with the care of your baby. Pump some of your breast milk so your partner can feed your baby during the night. Exercise after delivery:  Wait until your healthcare provider says it is okay to exercise. Exercise can help you lose weight, increase your energy levels, and manage your mood. It can also prevent constipation and blood clots. Start with gentle exercises such as walking. Do more as you have more energy. You may need to avoid abdominal exercises for 1 to 2 weeks after you deliver. Talk to your healthcare provider about an exercise plan that is right for you. Sexual activity after delivery:   Do not have sex until your healthcare provider says it is okay. You may need to wait 4 to 6 weeks before you have sex. This may prevent infection and allow time to heal.    Your menstrual cycle may begin as soon as 3 weeks after you deliver. Your period may be delayed if you breastfeed your baby. You can become pregnant before you get your first postpartum period.  Talk to your healthcare provider about birth control that is right for you. Some types of birth control are not safe during breastfeeding. For support and more information:  Join a support group for new mothers. Ask for help from family and friends with chores, errands, and care of your baby. Office of Ana Paula Choi, [facility]  [address], [address] [facility] , [address] [facility]  [address], [address] [facility] , [address]  Phone: [phone]  Web Address: www.womenshealth.gov  [facility] Postpartum [address] [facility] , [address]  [address] , [address]  Web Address: [email or web address]  Follow up with your doctor or obstetrician as directed: You will need to follow up within 2 to 6 weeks of delivery. Write down your questions so you remember to ask them at your visits. © Copyright Hoa Garrett 2023 Information is for End User's use only and may not be sold, redistributed or otherwise used for commercial purposes. The above information is an  only. It is not intended as medical advice for individual conditions or treatments. Talk to your doctor, nurse or pharmacist before following any medical regimen to see if it is safe and effective for you.

## 2023-10-13 ENCOUNTER — POSTPARTUM VISIT (OUTPATIENT)
Dept: OBGYN CLINIC | Facility: CLINIC | Age: 33
End: 2023-10-13

## 2023-10-13 VITALS
DIASTOLIC BLOOD PRESSURE: 70 MMHG | WEIGHT: 202 LBS | HEIGHT: 66 IN | BODY MASS INDEX: 32.47 KG/M2 | SYSTOLIC BLOOD PRESSURE: 120 MMHG

## 2023-10-13 PROBLEM — Z30.2 STERILIZATION: Status: RESOLVED | Noted: 2023-09-20 | Resolved: 2023-10-13

## 2023-10-13 PROCEDURE — 99024 POSTOP FOLLOW-UP VISIT: CPT | Performed by: OBSTETRICS & GYNECOLOGY

## 2024-02-14 NOTE — PROGRESS NOTES
Diagnoses and all orders for this visit:    Encounter for gynecological examination without abnormal finding      We have reviewed mechanism of action, benefits, risks, side effects of LARC's & IUD placement.  Advised to schedule for IUD insertion.  Patient has been advised to avoid intercourse for 2 weeks prior to insertion,  patient has been advised to take 600 mg of ibuprofen with food 1 hour prior to the appointment to help diminish cramping post placement.      Perineal hygiene reviewed   Weight bearing exercises minium of 150 mins/weekly advised.   Kegel exercises recommended daily, see AVS for instructions and recommendations  SBE encouraged, ASCCP guidelines reviewed. Condoms encouraged with all sexual activity to prevent STI's.   Gardisil vaccines recommended up to age 45  Calcium/ Vit D dietary requirements discussed,   Advised to call with any issues,  all concerns & questions addressed.   See provided information in your after visit summary     F/U Annually and PRN      Health Maintenance:    Last PAP: 2023 Negative HPV  Negative   Next PAP Due:    Last Mammogram: Not on file  advised age 38     Last Colonoscopy: Not on file    advised age 43    Gardisil: Not completed       Subjective    CC: Yearly Exam, 1st after delivery      Ozzy Casillas is a 33 y.o. female here for an annual exam.   GYN hx includes:  c section    No personal Hx of breast, cervical, ovarian or colon CA.   Family hx of:  No GYN cancers  Medically stable, reports no changes in medical Hx, follows with PMD    Patient's last menstrual period was 2024 (exact date).  Her menstrual cycles are regular every 28-30 days. She denies issues with bleeding during her menses.   Denies history of abnormal pap smear.  She denies breast concerns, abnormal vaginal discharge, vaginal itching, odor, irritation, bowel/bladder dysfunction, urinary symptoms, pelvic pain, or dyspareunia today.   She is sexually active.  Monogamous relationship.    Her current method of contraception includes condoms . She is requesting Paragard IUD, will schedule appt for insertion   She does not want STD testing today.  Denies intimate partner violence    History reviewed. No pertinent past medical history.  Past Surgical History:   Procedure Laterality Date    AUGMENTATION BREAST Bilateral 2016    [place]  DELIVERY ONLY N/A 2023    Procedure:  SECTION ();  Surgeon: Ricarda Greenwood MD;  Location: [facility] ;  Service: Obstetrics       Immunization History   Administered Date(s) Administered    Tdap 2023       Family History   Problem Relation Age of Onset    No Known Problems Mother     No Known Problems Father     No Known Problems Sister     No Known Problems Brother     Stroke Maternal Grandmother     Cancer Maternal Grandfather     Stroke Paternal Grandmother     Cancer Paternal Grandfather      Social History     Tobacco Use    Smoking status: Never    Smokeless tobacco: Never   Vaping Use    Vaping status: Never Used   Substance Use Topics    Alcohol use: Not Currently    Drug use: Never       Current Outpatient Medications:     acyclovir (ZOVIRAX) 5 % ointment, Apply topically every 4 (four) hours, Disp: 15 g, Rfl: 0    albuterol (2.5 mg/3 mL) 0.083 % nebulizer solution, Take 3 mL (2.5 mg total) by nebulization once as needed for shortness of breath for up to 1 dose, Disp: , Rfl: 0    ibuprofen (MOTRIN) 600 mg tablet, Take 1 tablet (600 mg total) by mouth every 6 (six) hours, Disp: 30 tablet, Rfl: 0  Patient Active Problem List    Diagnosis Date Noted    Elevated blood-pressure reading, without diagnosis of hypertension 2023       No Known Allergies    OB History    Para Term  AB Living   2 1 1   1 1   SAB IAB Ectopic Multiple Live Births   1     0 1      # Outcome Date GA Lbr Go/2nd Weight Sex Delivery Anes PTL Lv   2 Term 23 39w6d  2948 g (6 lb 8 oz) M CS-LTranv Spinal  JIN   1 SAB  "2020 4w0d              Vitals:    02/16/24 1333   BP: 120/70   BP Location: Right arm   Patient Position: Sitting   Cuff Size: Large   Weight: 92.1 kg (203 lb)   Height: 5' 6\" (1.676 m)     Body mass index is 32.77 kg/m².    Review of Systems     Constitutional: Negative for chills, fatigue, fever, headaches, visual disturbances, and unexpected weight change.   Respiratory: Negative for cough, & shortness of breath.  Cardiovascular: Negative for chest pain. .    Gastrointestinal: Negative for Abd pain, nausea & vomiting, constipation and diarrhea.   Genitourinary: Negative for difficulty urinating, dysuria, hematuria, dyspareunia, unusual vaginal bleeding or discharge  Skin: Negative skin changes    Physical Exam     Constitutional: Alert & Oriented x3, well-developed and well-nourished. No distress.   HENT: Atraumatic, Normocephalic, Conjunctivae clear  Neck: Normal range of motion. Neck supple. No thyromegaly, mass, nodules or tenderness  Pulmonary: Effort normal.   Abdominal: Soft. No tenderness or masses  Musculoskeletal: Normal ROM  Skin: Warm & Dry  Psychological: Normal mood, thought content, behavior & judgement     Breasts: bilateral augmentation   Right: tissue soft without masses, tenderness, skin changes or nipple discharge. No areas of erythema or pain. No subclavicular, axillary, pectoral adenopathy  Left:  tissue soft without masses, tenderness, skin changes or nipple discharge. No areas of erythema or pain. No subclavicular, axillary, pectoral adenopathy    Pelvic exam was performed with patient supine, lithotomy position.      Labia: Negative rash, tenderness, lesion or injury on the right labia.              Negative rash, tenderness, lesion or injury on the left labia.   Urethral meatus:  Negative for  tenderness, inflammation or discharge.   Uterus: not deviated, enlarged, fixed or tender.   Cervix: No CMT, no discharge or friability.   Right adnexa: no mass, no tenderness and no fullness.  Left " adnexa: no mass, no tenderness and no fullness.   Vagina: No erythema, tenderness, masses, or foreign body in the vagina. No signs of injury around the vagina. No unusual vaginal discharge   Perineum without lesions, signs of injury, erythema or swelling.  Inguinal Canal:        Right: No inguinal adenopathy or hernia present.        Left: No inguinal adenopathy or hernia present.

## 2024-02-16 ENCOUNTER — ANNUAL EXAM (OUTPATIENT)
Dept: OBGYN CLINIC | Facility: CLINIC | Age: 34
End: 2024-02-16
Payer: COMMERCIAL

## 2024-02-16 VITALS
DIASTOLIC BLOOD PRESSURE: 70 MMHG | WEIGHT: 203 LBS | SYSTOLIC BLOOD PRESSURE: 120 MMHG | HEIGHT: 66 IN | BODY MASS INDEX: 32.62 KG/M2

## 2024-02-16 DIAGNOSIS — Z01.419 ENCOUNTER FOR GYNECOLOGICAL EXAMINATION WITHOUT ABNORMAL FINDING: Primary | ICD-10-CM

## 2024-02-16 PROCEDURE — 99395 PREV VISIT EST AGE 18-39: CPT | Performed by: OBSTETRICS & GYNECOLOGY

## 2024-02-16 NOTE — PATIENT INSTRUCTIONS
Breast Self Exam for Women   AMBULATORY CARE:   A breast self-exam (BSE)  is a way to check your breasts for lumps and other changes. Regular BSEs can help you know how your breasts normally look and feel. Most breast lumps or changes are not cancer, but you should always have them checked by a healthcare provider.  Why you should do a BSE:  Breast cancer is the most common type of cancer in women. Even if you have mammograms, you may still want to do a BSE regularly. If you know how your breasts normally feel and look, it may help you know when to contact your healthcare provider. Mammograms can miss some cancers. You may find a lump during a BSE that did not show up on a mammogram.  When you should do a BSE:  If you have periods, you may want to do your BSE 1 week after your period ends. This is the time when your breasts may be the least swollen, lumpy, or tender. You can do regular BSEs even if you are breastfeeding or have breast implants.  Call your doctor if:   You find any lumps or changes in your breasts.    You have breast pain or fluid coming from your nipples.    You have questions or concerns about your condition or care.    How to do a BSE:       Look at your breasts in a mirror.  Look at the size and shape of each breast and nipple. Check for swelling, lumps, dimpling, scaly skin, or other skin changes. Look for nipple changes, such as a nipple that is painful or beginning to pull inward. Gently squeeze both nipples and check to see if fluid (that is not breast milk) comes out of them. If you find any of these or other breast changes, contact your healthcare provider. Check your breasts while you sit or  the following 3 positions:    Hang your arms down at your sides.    Raise your hands and join them behind your head.    Put firm pressure with your hands on your hips. Bend slightly forward while you look at your breasts in the mirror.    Lie down and feel your breasts.  When you lie down,  your breast tissue spreads out evenly over your chest. This makes it easier for you to feel for lumps and anything that may not be normal for your breasts. Do a BSE on one breast at a time.    Place a small pillow or towel under your left shoulder.  Put your left arm behind your head.    Use the 3 middle fingers of your right hand.  Use your fingertip pads, on the top of your fingers. Your fingertip pad is the most sensitive part of your finger.    Use small circles to feel your breast tissue.  Use your fingertip pads to make dime-sized, overlapping circles on your breast and armpits. Use light, medium, and firm pressure. First, press lightly. Second, press with medium pressure to feel a little deeper into the breast. Last, use firm pressure to feel deep within your breast.    Examine your entire breast area.  Examine the breast area from above the breast to below the breast where you feel only ribs. Make small circles with your fingertips, starting in the middle of your armpit. Make circles going up and down the breast area. Continue toward your breast and all the way across it. Examine the area from your armpit all the way over to the middle of your chest (breastbone). Stop at the middle of your chest.    Move the pillow or towel to your right shoulder, and put your right arm behind your head.  Use the 3 fingertip pads of your left hand, and repeat the above steps to do a BSE on your right breast.  What else you can do to check for breast problems or cancer:  Talk to your healthcare provider about mammograms. A mammogram is an x-ray of your breasts to screen for breast cancer or other problems. Your provider can tell you the benefits and risks of mammograms. The first mammogram is usually at age 45 or 50. Your provider may recommend you start at 40 or younger if your risk for breast cancer is high. Mammograms usually continue every 1 to 2 years until age 74.       Follow up with your doctor as directed:  Write  down your questions so you remember to ask them during your visits.  © Copyright [employer] 2023 Information is for End User's use only and may not be sold, redistributed or otherwise used for commercial purposes.  The above information is an  only. It is not intended as medical advice for individual conditions or treatments. Talk to your doctor, nurse or pharmacist before following any medical regimen to see if it is safe and effective for you.  Wellness Visit for Adults   AMBULATORY CARE:   A wellness visit  is when you see your healthcare provider to get screened for health problems. Your healthcare provider will also give you advice on how to stay healthy. Write down your questions so you remember to ask them. Ask your healthcare provider how often you should have a wellness visit.  What happens at a wellness visit:  Your healthcare provider will ask about your health, and your family history of health problems. This includes high blood pressure, heart disease, and cancer. He or she will ask if you have symptoms that concern you, if you smoke, and about your mood. You may also be asked about your intake of medicines, supplements, food, and alcohol. Any of the following may be done:  Your weight  will be checked. Your height may also be checked so your body mass index (BMI) can be calculated. Your BMI shows if you are at a healthy weight.    Your blood pressure  and heart rate will be checked. Your temperature may also be checked.    Blood and urine tests  may be done. Blood tests may be done to check your cholesterol levels. Abnormal cholesterol levels increase your risk for heart disease and stroke. You may also need a blood or urine test to check for diabetes if you are at increased risk. Urine tests may be done to look for signs of an infection or kidney disease.    A physical exam  includes checking your heartbeat and lungs with a stethoscope. Your healthcare provider may also check your skin to  look for sun damage.    Screening tests  may be recommended. A screening test is done to check for diseases that may not cause symptoms. The screening tests you may need depend on your age, gender, family history, and lifestyle habits. For example, colorectal screening may be recommended if you are 50 years old or older.    Screening tests you need if you are a woman:   A Pap smear  is used to screen for cervical cancer. Pap smears are usually done every 3 to 5 years depending on your age. You may need them more often if you have had abnormal Pap smear test results in the past. Ask your healthcare provider how often you should have a Pap smear.    A mammogram  is an x-ray of your breasts to screen for breast cancer. Experts recommend mammograms every 2 years starting at age 50 years. You may need a mammogram at age 49 years or younger if you have an increased risk for breast cancer. Talk to your healthcare provider about when you should start having mammograms and how often you need them.    Vaccines you may need:   Get an influenza vaccine  every year. The influenza vaccine protects you from the flu. Several types of viruses cause the flu. The viruses change over time, so new vaccines are made each year.    Get a tetanus-diphtheria (Td) booster vaccine  every 10 years. This vaccine protects you against tetanus and diphtheria. Tetanus is a severe infection that may cause painful muscle spasms and lockjaw. Diphtheria is a severe bacterial infection that causes a thick covering in the back of your mouth and throat.    Get a human papillomavirus (HPV) vaccine  if you are female and aged 19 to 26 or male 19 to 21 and never received it. This vaccine protects you from HPV infection. HPV is the most common infection spread by sexual contact. HPV may also cause vaginal, penile, and anal cancers.    Get a pneumococcal vaccine  if you are aged 65 years or older. The pneumococcal vaccine is an injection given to protect you  from pneumococcal disease. Pneumococcal disease is an infection caused by pneumococcal bacteria. The infection may cause pneumonia, meningitis, or an ear infection.    Get a shingles vaccine  if you are 60 or older, even if you have had shingles before. The shingles vaccine is an injection to protect you from the varicella-zoster virus. This is the same virus that causes chickenpox. Shingles is a painful rash that develops in people who had chickenpox or have been exposed to the virus.    How to eat healthy:  My Plate is a model for planning healthy meals. It shows the types and amounts of foods that should go on your plate. Fruits and vegetables make up about half of your plate, and grains and protein make up the other half. A serving of dairy is included on the side of your plate. The amount of calories and serving sizes you need depends on your age, gender, weight, and height. Examples of healthy foods are listed below:  Eat a variety of vegetables  such as dark green, red, and orange vegetables. You can also include canned vegetables low in sodium (salt) and frozen vegetables without added butter or sauces.    Eat a variety of fresh fruits , canned fruit in 100% juice, frozen fruit, and dried fruit.    Include whole grains.  At least half of the grains you eat should be whole grains. Examples include whole-wheat bread, wheat pasta, brown rice, and whole-grain cereals such as oatmeal.    Eat a variety of protein foods such as seafood (fish and shellfish), lean meat, and poultry without skin (turkey and chicken). Examples of lean meats include pork leg, shoulder, or tenderloin, and beef round, sirloin, tenderloin, and extra lean ground beef. Other protein foods include eggs and egg substitutes, beans, peas, soy products, nuts, and seeds.    Choose low-fat dairy products such as skim or 1% milk or low-fat yogurt, cheese, and cottage cheese.    Limit unhealthy fats  such as butter, hard margarine, and shortening.        Exercise:  Exercise at least 30 minutes per day on most days of the week. Some examples of exercise include walking, biking, dancing, and swimming. You can also fit in more physical activity by taking the stairs instead of the elevator or parking farther away from stores. Include muscle strengthening activities 2 days each week. Regular exercise provides many health benefits. It helps you manage your weight, and decreases your risk for type 2 diabetes, heart disease, stroke, and high blood pressure. Exercise can also help improve your mood. Ask your healthcare provider about the best exercise plan for you.       General health and safety guidelines:   Do not smoke.  Nicotine and other chemicals in cigarettes and cigars can cause lung damage. Ask your healthcare provider for information if you currently smoke and need help to quit. E-cigarettes or smokeless tobacco still contain nicotine. Talk to your healthcare provider before you use these products.    Limit alcohol.  A drink of alcohol is 12 ounces of beer, 5 ounces of wine, or 1½ ounces of liquor.    Lose weight, if needed.  Being overweight increases your risk of certain health conditions. These include heart disease, high blood pressure, type 2 diabetes, and certain types of cancer.    Protect your skin.  Do not sunbathe or use tanning beds. Use sunscreen with a SPF 15 or higher. Apply sunscreen at least 15 minutes before you go outside. Reapply sunscreen every 2 hours. Wear protective clothing, hats, and sunglasses when you are outside.    Drive safely.  Always wear your seatbelt. Make sure everyone in your car wears a seatbelt. A seatbelt can save your life if you are in an accident. Do not use your cell phone when you are driving. This could distract you and cause an accident. Pull over if you need to make a call or send a text message.    Practice safe sex.  Use latex condoms if are sexually active and have more than one partner. Your healthcare  provider may recommend screening tests for sexually transmitted infections (STIs).    Wear helmets, lifejackets, and protective gear.  Always wear a helmet when you ride a bike or motorcycle, go skiing, or play sports that could cause a head injury. Wear protective equipment when you play sports. Wear a lifejacket when you are on a boat or doing water sports.    © Copyright [employer] 2023 Information is for End User's use only and may not be sold, redistributed or otherwise used for commercial purposes.  The above information is an  only. It is not intended as medical advice for individual conditions or treatments. Talk to your doctor, nurse or pharmacist before following any medical regimen to see if it is safe and effective for you.  Kegel Exercises for Women   AMBULATORY CARE:   Kegel exercises  help strengthen your pelvic muscles. Pelvic muscles hold your pelvic organs, such as your bladder and uterus, in place. Kegel exercises help prevent or control certain conditions, such as urine incontinence (leakage) or uterine prolapse.       Call your doctor or physical therapist if:   You cannot feel your muscles tighten or relax.    You continue to leak urine.    You have questions or concerns about your condition or care.    Use the correct muscles:  Pelvic muscles are the muscles you use to control urine flow. To target these muscles, stop and start the flow of urine several times. This will help you become familiar with how it feels to tighten and relax these muscles.  How to do Kegel exercises:   Get into a comfortable position.  You may lie down, stand up, or sit down to do these exercises. When you first try to do these exercises, it may be easier if you lie down.    Tighten or squeeze your pelvic muscles slowly.  It may feel like you are trying to hold back urine or gas. Hold this position for 3 seconds. Relax for 3 seconds. Repeat this cycle 10 times. Do not hold your breath when you do Kegel  exercises. Keep your stomach, back, and leg muscles relaxed.    Do 10 sets of Kegel exercises, at least 3 times a day.  When you know how to do Kegel exercises, use different positions. This will help to strengthen your pelvic muscles as much as possible. You can do these exercises while you lie on the floor, watch TV, or while you stand. Tighten your pelvic muscles before you sneeze, cough, or lift to prevent urine leakage. You may notice improved bladder control within about 6 weeks.    Follow up with your doctor or physical therapist as directed:  Write down your questions so you remember to ask them during your visits.  © Copyright [employer] 2023 Information is for End User's use only and may not be sold, redistributed or otherwise used for commercial purposes.  The above information is an  only. It is not intended as medical advice for individual conditions or treatments. Talk to your doctor, nurse or pharmacist before following any medical regimen to see if it is safe and effective for you.       Perineal Hygiene      Your vaginal naturally takes care of its self, it is a self washing system, the less you mess the healthier it will be     No soaps or feminine wash to the vulva, these products can cause dermitis, bacterial infections and other vulvar problems.   Use only water to cleanse, or water with Dove or Dove Sensitive Skin Bar soap if necessary.    No scented lotions or products are advised in or near your vulva.    Use only coconut oil for moisture if needed.  No douching this may cause imbalance in your vaginal PH and further issues.    If you wear panty liners, you may apply a thin coating of Vaseline, A&D ointment or coconut oil to the vulvar tissues as a skin barrier     Cotton underware, loose fitting clothing  Only perfume-free, dye-free laundry detergent, use a second rinse cycle   Avoid fabric softeners/dryer sheets.       Your partner should avoid the same products as well.        Over the counter probiotic to restore vaginal darwin may be helpful as well, take daily.       You may also look into Boric Acid vaginal suppositories to restore vaginal PH balance for up to 2 weeks as directed on the box. You may not use these if you are pregnant      For vaginal dryness:      You may use:     Coconut oil (organic, pure, unscented) as needed for moisture or lubrication. ( Do not use if allergic)       Replens moisture restore external comfort gel daily ( use as directed on the box)        Replens long lasting vaginal moisturizer  ( use as directed on the box)         For Vaginal Lubrication:          You may use:     Coconut oil (organic, pure, unscented) as a lubricant or another scent-free lubricant (Astroglide, Uberlube) if needed.  Do not use coconut oil or silicone if using a condom as this may break down the integrity of the condom and cause an unplanned pregnancy              Do not use coconut oil if allergic               Replens silky smooth lubricant, premium silicone based lubricant for intercourse. ( use as directed, a small amount will provide an enhanced natural feeling)     Any premium over the counter vaginal lubricant water or silicone based. Silicone based will have more staying power.     Intrauterine Device   AMBULATORY CARE:   An IUD  is a type of birth control that is inserted into your uterus. It is a small, flexible piece of plastic with a string on the end. It is inserted and removed by your healthcare provider. IUDs prevent sperm from reaching or fertilizing an egg. IUDs also prevent a fertilized egg from attaching to the uterus and developing into a fetus.       Common types of IUDs:  Your healthcare provider will recommend the type of IUD that is right for you. This is based on your age and if you have had a child. If you have not had a child, a smaller IUD will be used.     A copper IUD  slowly releases a small amount of copper into your uterus. This IUD can remain in  place for up to 10 years.    A hormone-releasing IUD  slowly releases a small amount of progesterone into your uterus. Progesterone is a hormone that is made by your body to help control your periods. This IUD can remain in place for 3 to 5 years.  Seek care immediately if:   You have severe pain or bleeding during your period.    You have a fever and severe abdominal pain.    Call your doctor or gynecologist if:   You think you are pregnant.    The IUD has come out.    You have bleeding from your vagina after you have sex, and it is not your period.    You have pain during sex.    You cannot feel the IUD string, the string feels longer, or you feel the plastic of the IUD itself.    You have vaginal discharge that is green, yellow, or has a foul odor.    You have questions or concerns about your condition or care.    Advantages of an IUD:   An IUD is 98% to 99% effective in preventing pregnancy.    The IUD can be removed by your healthcare provider if you decide to have a baby. You may be able to get pregnant as soon as the IUD is removed.    An IUD protects you from pregnancy right after it is inserted.    You do not have to stop sexual activity to insert it. You do not have to remember to take your birth control pill.    Copper IUDs are safer for some women than oral birth control pills. Examples include women who smoke or have a history of blood clots.    Hormone-releasing IUDs may decrease certain health problems. Examples include bleeding and cramping that happen with your monthly period.    Disadvantages of an IUD:   There is a small chance that you could get pregnant. Sometimes the IUD cannot be removed after you get pregnant. This increases your risk of a miscarriage or an ectopic pregnancy. Ectopic pregnancy is when the fertilized egg starts to grow somewhere other than your uterus.    An IUD does not protect you from sexually transmitted infections.    You may have cramps during the first weeks after you  get the IUD.    A copper IUD may cause your monthly period to be heavier or more painful. This is more common within the first 3 months after you get the IUD. You may need to have your IUD removed if your bleeding or pain becomes severe. You may have spotting between periods.    There is a small risk of an infection within the first 20 days after the IUD is placed. Infection can lead to pelvic inflammatory disease. This can cause infertility.    Your uterus may tear when the IUD is inserted. The IUD may slip part or all of the way out of your uterus.    Self-care:   NSAIDs , such as ibuprofen, help decrease swelling, pain, and fever. This medicine is available with or without a doctor's order. NSAIDs can cause stomach bleeding or kidney problems in certain people. If you take blood thinner medicine, always ask if NSAIDs are safe for you. Always read the medicine label and follow directions.    Apply heat to relieve pain and cramping.  Use a heating pad set on low. Apply heat to your lower abdomen for 20 minutes every hour, or as directed.    Return to activities as directed.  Your healthcare provider will tell you when it is okay to return to work, school, or other activities.    Do not use a tampon or have sex  until your provider says it is okay.    Make sure your IUD is in place:  An IUD has a string that is made of plastic thread. One to 2 inches of this string hangs into your vagina. You cannot see this string, and it should not cause problems when you have sex. Check your IUD string every 3 days for the first 3 months that you have your IUD. After that, check the string after each monthly period. Do the following to check the placement of your IUD:  Wash your hands with soap and warm water. Dry them with a clean towel.    Bend your knees and squat low to the ground.    Gently put your index finger inside your vagina. The cervix is at the top of the vagina and feels like the tip of your nose. Feel for the IUD  string. Do not pull on the string. You should not be able to feel the firm plastic of the IUD itself.     Wash your hands after you check your IUD string.    For more information:   [facility]  [address] [place] [id number]  Phone: [phone]  Web Address: [email or web address]    Follow up with your doctor as directed:  Write down your questions so you remember to ask them during your visits.  © Copyright [employer] 2023 Information is for End User's use only and may not be sold, redistributed or otherwise used for commercial purposes.  The above information is an  only. It is not intended as medical advice for individual conditions or treatments. Talk to your doctor, nurse or pharmacist before following any medical regimen to see if it is safe and effective for you.

## 2024-09-23 ENCOUNTER — OFFICE VISIT (OUTPATIENT)
Dept: FAMILY MEDICINE CLINIC | Facility: CLINIC | Age: 34
End: 2024-09-23
Payer: COMMERCIAL

## 2024-09-23 VITALS
DIASTOLIC BLOOD PRESSURE: 68 MMHG | SYSTOLIC BLOOD PRESSURE: 110 MMHG | HEIGHT: 66 IN | BODY MASS INDEX: 28.7 KG/M2 | TEMPERATURE: 97.6 F | WEIGHT: 178.57 LBS | HEART RATE: 51 BPM | OXYGEN SATURATION: 96 %

## 2024-09-23 DIAGNOSIS — Z83.3 FAMILY HISTORY OF DIABETES MELLITUS (DM): ICD-10-CM

## 2024-09-23 DIAGNOSIS — R10.2 PELVIC PAIN: ICD-10-CM

## 2024-09-23 DIAGNOSIS — Z76.89 ENCOUNTER TO ESTABLISH CARE WITH NEW DOCTOR: Primary | ICD-10-CM

## 2024-09-23 DIAGNOSIS — I83.813 VARICOSE VEINS OF BOTH LOWER EXTREMITIES WITH PAIN: ICD-10-CM

## 2024-09-23 DIAGNOSIS — Z00.00 ANNUAL PHYSICAL EXAM: ICD-10-CM

## 2024-09-23 PROCEDURE — 99385 PREV VISIT NEW AGE 18-39: CPT | Performed by: FAMILY MEDICINE

## 2024-09-23 PROCEDURE — 99214 OFFICE O/P EST MOD 30 MIN: CPT | Performed by: FAMILY MEDICINE

## 2024-09-23 NOTE — PROGRESS NOTES
Adult Annual Physical  Name: Ozzy Casillas      : 1990      MRN: [id number]  Encounter Provider: Libra Diallo DO  Encounter Date: 2024   Encounter department: [facility] [address]    Assessment & Plan  Encounter to establish care with new doctor         Family history of diabetes mellitus (DM)    Orders:    Comprehensive metabolic panel; Future    Semaglutide-Weight Management (WEGOVY) 0.25 MG/0.5ML; Inject 0.5 mL (0.25 mg total) under the skin once a week for 28 days    Semaglutide-Weight Management (WEGOVY) 0.5 MG/0.5ML; Inject 0.5 mL (0.5 mg total) under the skin once a week for 28 days Do not start before 2024.    BMI 29.0-29.9,adult    Orders:    TSH, 3rd generation with Free T4 reflex; Future    Semaglutide-Weight Management (WEGOVY) 0.25 MG/0.5ML; Inject 0.5 mL (0.25 mg total) under the skin once a week for 28 days    Semaglutide-Weight Management (WEGOVY) 0.5 MG/0.5ML; Inject 0.5 mL (0.5 mg total) under the skin once a week for 28 days Do not start before 2024.    Lipid panel; Future    Annual physical exam    Orders:    CBC and differential; Future    Comprehensive metabolic panel; Future    TSH, 3rd generation with Free T4 reflex; Future    Lipid panel; Future    Varicose veins of both lower extremities with pain    Orders:    Ambulatory Referral to Vascular Surgery; Future    Pelvic pain    Orders:    US pelvis complete non OB; Future    Immunizations and preventive care screenings were discussed with patient today. Appropriate education was printed on patient's after visit summary.    Counseling:  Alcohol/drug use: discussed moderation in alcohol intake, the recommendations for healthy alcohol use, and avoidance of illicit drug use.  Dental Health: discussed importance of regular tooth brushing, flossing, and dental visits.  Sexual health: discussed sexually transmitted diseases, partner selection, use of condoms,  "avoidance of unintended pregnancy, and contraceptive alternatives.  Exercise: the importance of regular exercise/physical activity was discussed. Recommend exercise 3-5 times per week for at least 30 minutes.        History of Present Illness     Adult Annual Physical:  Patient presents for annual physical. Notes that she has been struggling with weight loss. States that she was previously on GLP-1 when she was in [place]. States that she was on this because she was over weight. She was on this for about 3-4 months, she was able to lose weight while on this medication.     She is no longer breast feeding.     Notes some left side lower abdominal/pelvic pain. Happens once daily. Does not seem to be effected by movement. Notes that this is random. Denies any relation to foods that she eats or Bms. Denies diarrhea or constipation. Denies tenderness to touch. Notes some pain with intercourse. Denies vaginal discharge or itch. Notes that she has had some cramping with intercourse since she had the baby. .     Diet and Physical Activity:  - Diet/Nutrition: well balanced diet.  - Exercise: moderate cardiovascular exercise and strength training exercises. doing cross fit daily.    Depression Screening:  - PHQ-2 Score: 0    General Health:  - Sleep: sleeps well and 4-6 hours of sleep on average.  - Hearing: normal hearing bilateral ears.  - Vision: vision problems.  - Dental: no dental visits for > 1 year and brushes teeth twice daily. flossing BID    /GYN Health:  - Follows with GYN: yes.   - Menopause: premenopausal.   - Last menstrual cycle: 8/29/2024.   - History of STDs: no    Review of Systems      Objective     /68 (BP Location: Left arm, Patient Position: Sitting, Cuff Size: Large)   Pulse (!) 51   Temp 97.6 °F (36.4 °C) (Tympanic)   Ht 5' 5.75\" (1.67 m)   Wt 81 kg (178 lb 9.2 oz)   SpO2 96%   BMI 29.04 kg/m²     Physical Exam  Vitals reviewed.   Constitutional:       General: She is not in acute " distress.     Appearance: Normal appearance.   HENT:      Head: Normocephalic and atraumatic.      Right Ear: External ear normal.      Left Ear: External ear normal.      Nose: Nose normal.      Mouth/Throat:      Mouth: Mucous membranes are moist.   Eyes:      Extraocular Movements: Extraocular movements intact.      Conjunctiva/sclera: Conjunctivae normal.      Pupils: Pupils are equal, round, and reactive to light.   Cardiovascular:      Rate and Rhythm: Normal rate and regular rhythm.      Heart sounds: Normal heart sounds.   Pulmonary:      Effort: Pulmonary effort is normal.      Breath sounds: Normal breath sounds.   Abdominal:      General: Bowel sounds are normal. There is no distension.      Palpations: Abdomen is soft.      Tenderness: There is no abdominal tenderness.   Musculoskeletal:      Cervical back: Neck supple.      Right lower leg: No edema.      Left lower leg: No edema.   Lymphadenopathy:      Cervical: No cervical adenopathy.   Skin:     General: Skin is warm.      Capillary Refill: Capillary refill takes less than 2 seconds.      Findings: No rash.      Comments: Varicose veins of B/L LE, no inflammation or tenderness   Neurological:      Mental Status: She is alert. Mental status is at baseline.               DO Isaías Mckeon [facility]  9/23/2024 9:32 AM

## 2024-11-18 ENCOUNTER — TELEPHONE (OUTPATIENT)
Dept: VASCULAR SURGERY | Facility: CLINIC | Age: 34
End: 2024-11-18

## 2024-11-18 NOTE — TELEPHONE ENCOUNTER
11/19/24 appt with Frances Rosario cancelled, provider out. Left message and CARLOS message to reschedule.

## 2025-01-02 NOTE — PROGRESS NOTES
Name: Ozzy Casillas      : 1990      MRN: [id number]  Encounter Provider: LU Greenberg  Encounter Date: 2025   Enc[facility] department: [facility]  :  Assessment & Plan  Edema, lower extremity  33yo without significant medical history c/o BLE aching pain worse at night.     Patient is primarily Armenian speaking. Her  Diogo assists with translation of OV today.     - Chief complaint of pain in BLE worsening over time. Reports family h/o lipedema. 1 pregnancy 15months ago.   - No truncal varicosities  - No edema  - RLE anterior shin small spider vein cluster, R medial calf dilated reticular vein  - No compression use    Plan:  - Conservative medical management with daily use of medical grade compression stockings 20-30 mmHg.  On a.m., off in p.m.  Frequent ambulation   Leg elevation at rest   Moisturizer and diligent skin care to maintain skin integrity and prevent skin breakdown  -No vascular imaging indicated  -Return PRN      Orders:    Compression Stocking    Varicose veins of both lower extremities with pain  No varicose veins  Orders:    Ambulatory Referral to Vascular Surgery    Spider vein of right lower extremity  R anterior shin spider cluster.   Discussed cosmetic sclerotherapy.   Patient is NOT interested in injections  Orders:    Compression Stocking        History of Present Illness     New patient, referred for VV and presents today for evaluation. BLE  aching and throbbing. No previous h/o vein treatment. Not wearing compression.     HPI  Ozzy Casillas is a 34 y.o. female who presents with c/o BLE leg pain. She does not have varicose veins.   She does have small RLE anterior shin spider vein cluster not interested in cosmetic sclerotherapy.     History obtained from: patient    Review of Systems   Constitutional: Negative.    HENT: Negative.     Eyes: Negative.    Respiratory: Negative.     Cardiovascular:         Painful veins    Gastrointestinal: Negative.    Endocrine: Negative.    Genitourinary: Negative.    Musculoskeletal: Negative.    Skin: Negative.    Allergic/Immunologic: Negative.    Neurological: Negative.    Hematological: Negative.    Psychiatric/Behavioral: Negative.       Medical History Reviewed by provider this encounter:  Problems     .  Past Medical History   History reviewed. No pertinent past medical history.  Past Surgical History:   Procedure Laterality Date    AUGMENTATION BREAST Bilateral     SD  DELIVERY ONLY N/A 2023    Procedure:  SECTION ();  Surgeon: Ricarda Greenwood MD;  Location: [facility] ;  Service: Obstetrics     Family History   Problem Relation Age of Onset    No Known Problems Mother     No Known Problems Father     No Known Problems Sister     No Known Problems Brother     Stroke Maternal Grandmother     Cancer Maternal Grandfather     Stroke Paternal Grandmother     Cancer Paternal Grandfather       reports that she has never smoked. She has never used smokeless tobacco. She reports that she does not currently use alcohol. She reports that she does not use drugs.  Current Outpatient Medications on File Prior to Visit   Medication Sig Dispense Refill    acyclovir (ZOVIRAX) 5 % ointment Apply topically every 4 (four) hours (Patient not taking: Reported on 2024) 15 g 0    albuterol (2.5 mg/3 mL) 0.083 % nebulizer solution Take 3 mL (2.5 mg total) by nebulization once as needed for shortness of breath for up to 1 dose (Patient not taking: Reported on 2024)  0    ibuprofen (MOTRIN) 600 mg tablet Take 1 tablet (600 mg total) by mouth every 6 (six) hours (Patient not taking: Reported on 2024) 30 tablet 0     No current facility-administered medications on file prior to visit.   No Known Allergies   Current Outpatient Medications on File Prior to Visit   Medication Sig Dispense Refill    acyclovir (ZOVIRAX) 5 % ointment Apply topically every 4 (four) hours  "(Patient not taking: Reported on 9/23/2024) 15 g 0    albuterol (2.5 mg/3 mL) 0.083 % nebulizer solution Take 3 mL (2.5 mg total) by nebulization once as needed for shortness of breath for up to 1 dose (Patient not taking: Reported on 9/23/2024)  0    ibuprofen (MOTRIN) 600 mg tablet Take 1 tablet (600 mg total) by mouth every 6 (six) hours (Patient not taking: Reported on 9/23/2024) 30 tablet 0     No current facility-administered medications on file prior to visit.      Social History     Tobacco Use    Smoking status: Never    Smokeless tobacco: Never   Vaping Use    Vaping status: Never Used   Substance and Sexual Activity    Alcohol use: Not Currently    Drug use: Never    Sexual activity: Yes     Partners: Male     Birth control/protection: Condom Male        Objective   /70 (BP Location: Left arm, Patient Position: Sitting)   Pulse 58   Ht 5' 6\" (1.676 m)   Wt 82.1 kg (181 lb)   BMI 29.21 kg/m²      Physical Exam  Vitals reviewed.   Constitutional:       General: She is not in acute distress.  Cardiovascular:      Rate and Rhythm: Normal rate.      Pulses: Normal pulses.           Dorsalis pedis pulses are 2+ on the right side and 2+ on the left side.        Posterior tibial pulses are 2+ on the right side and 2+ on the left side.   Pulmonary:      Effort: Pulmonary effort is normal.   Musculoskeletal:         General: Normal range of motion.      Right lower leg: No edema.      Left lower leg: No edema.   Skin:     General: Skin is warm.      Capillary Refill: Capillary refill takes less than 2 seconds.      Comments: R anterior shin spider cluster  R medial reticular vein   Neurological:      Mental Status: She is alert and oriented to person, place, and time.      Sensory: No sensory deficit.      Motor: No weakness.   Psychiatric:         Behavior: Behavior normal.         Administrative Statements   I have spent a total time of 20 minutes in caring for this patient on the day of the " visit/encounter including Instructions for management, Patient and family education, Risk factor reductions, Impressions, Documenting in the medical record, and Obtaining or reviewing history  .

## 2025-01-06 ENCOUNTER — OFFICE VISIT (OUTPATIENT)
Dept: VASCULAR SURGERY | Facility: CLINIC | Age: 35
End: 2025-01-06
Payer: COMMERCIAL

## 2025-01-06 VITALS
HEART RATE: 58 BPM | WEIGHT: 181 LBS | HEIGHT: 66 IN | BODY MASS INDEX: 29.09 KG/M2 | DIASTOLIC BLOOD PRESSURE: 70 MMHG | SYSTOLIC BLOOD PRESSURE: 100 MMHG

## 2025-01-06 DIAGNOSIS — R60.0 EDEMA, LOWER EXTREMITY: Primary | ICD-10-CM

## 2025-01-06 DIAGNOSIS — I83.813 VARICOSE VEINS OF BOTH LOWER EXTREMITIES WITH PAIN: ICD-10-CM

## 2025-01-06 DIAGNOSIS — I83.91 SPIDER VEIN OF RIGHT LOWER EXTREMITY: ICD-10-CM

## 2025-01-06 PROCEDURE — 99203 OFFICE O/P NEW LOW 30 MIN: CPT | Performed by: NURSE PRACTITIONER

## 2025-01-06 NOTE — PATIENT INSTRUCTIONS
Conservative medical management with daily use of medical grade compression stockings 20-30 mmHg.  On a.m., off in p.m.  Frequent ambulation   Leg elevation at rest   Moisturizer and diligent skin care to maintain skin integrity and prevent skin breakdown    Return PRN

## 2025-01-07 NOTE — ASSESSMENT & PLAN NOTE
R anterior shin spider cluster.   Discussed cosmetic sclerotherapy.   Patient is NOT interested in injections  Orders:    Compression Stocking

## 2025-01-07 NOTE — ASSESSMENT & PLAN NOTE
35yo without significant medical history c/o BLE aching pain worse at night.     Patient is primarily Indian speaking. Her  Diogo assists with translation of OV today.     - Chief complaint of pain in BLE worsening over time. Reports family h/o lipedema. 1 pregnancy 15months ago.   - No truncal varicosities  - No edema  - RLE anterior shin small spider vein cluster, R medial calf dilated reticular vein  - No compression use    Plan:  - Conservative medical management with daily use of medical grade compression stockings 20-30 mmHg.  On a.m., off in p.m.  Frequent ambulation   Leg elevation at rest   Moisturizer and diligent skin care to maintain skin integrity and prevent skin breakdown  -No vascular imaging indicated  -Return PRN      Orders:    Compression Stocking

## 2025-02-01 ENCOUNTER — HOSPITAL ENCOUNTER (OUTPATIENT)
Dept: ULTRASOUND IMAGING | Facility: HOSPITAL | Age: 35
Discharge: HOME | End: 2025-02-01
Attending: FAMILY MEDICINE
Payer: COMMERCIAL

## 2025-02-01 DIAGNOSIS — R10.2 PELVIC PAIN: ICD-10-CM

## 2025-02-01 PROCEDURE — 76830 TRANSVAGINAL US NON-OB: CPT

## 2025-02-01 PROCEDURE — 76856 US EXAM PELVIC COMPLETE: CPT

## 2025-02-07 ENCOUNTER — RESULTS FOLLOW-UP (OUTPATIENT)
Dept: FAMILY MEDICINE CLINIC | Facility: CLINIC | Age: 35
End: 2025-02-07

## 2025-07-15 ENCOUNTER — OFFICE VISIT (OUTPATIENT)
Dept: INTERNAL MEDICINE CLINIC | Facility: CLINIC | Age: 35
End: 2025-07-15
Payer: COMMERCIAL

## 2025-07-15 VITALS
WEIGHT: 168 LBS | BODY MASS INDEX: 27 KG/M2 | TEMPERATURE: 98.3 F | OXYGEN SATURATION: 98 % | HEIGHT: 66 IN | DIASTOLIC BLOOD PRESSURE: 60 MMHG | RESPIRATION RATE: 12 BRPM | HEART RATE: 72 BPM | SYSTOLIC BLOOD PRESSURE: 104 MMHG

## 2025-07-15 DIAGNOSIS — R59.9 LYMPH NODE ENLARGEMENT: Primary | ICD-10-CM

## 2025-07-15 DIAGNOSIS — R03.0 ELEVATED BLOOD-PRESSURE READING, WITHOUT DIAGNOSIS OF HYPERTENSION: ICD-10-CM

## 2025-07-15 PROBLEM — R60.0 EDEMA, LOWER EXTREMITY: Status: RESOLVED | Noted: 2025-01-06 | Resolved: 2025-07-15

## 2025-07-15 PROCEDURE — 99204 OFFICE O/P NEW MOD 45 MIN: CPT | Performed by: INTERNAL MEDICINE

## 2025-07-15 RX ORDER — TIRZEPATIDE 5 MG/.5ML
INJECTION, SOLUTION SUBCUTANEOUS
COMMUNITY

## (undated) DEVICE — GAUZE SPONGES,16 PLY: Brand: CURITY

## (undated) DEVICE — MEDI-VAC YANKAUER SUCTION HANDLE W/STRAIGHT TIP & CONTROL VENT: Brand: CARDINAL HEALTH

## (undated) DEVICE — PACK C-SECTION PBDS

## (undated) DEVICE — LARGE, DISPOSABLE ALEXIS O C-SECTION PROTECTOR - RETRACTOR: Brand: ALEXIS ® O C-SECTION PROTECTOR - RETRACTOR

## (undated) DEVICE — Device

## (undated) DEVICE — ADHESIVE SKIN HIGH VISCOSITY EXOFIN 1ML

## (undated) DEVICE — TELFA NON-ADHERENT ABSORBENT DRESSING: Brand: TELFA

## (undated) DEVICE — SUT VICRYL 0 CTX 36 IN J978H

## (undated) DEVICE — SKIN MARKER DUAL TIP WITH RULER CAP, FLEXIBLE RULER AND LABELS: Brand: DEVON

## (undated) DEVICE — SUT VICRYL 0 CT-1 36 IN J946H

## (undated) DEVICE — ABDOMINAL PAD: Brand: DERMACEA

## (undated) DEVICE — GLOVE PI ULTRA TOUCH SZ.6.5

## (undated) DEVICE — SUT MONOCRYL 3-0 UNDYED KS CS-1 Y523H

## (undated) DEVICE — CHLORAPREP HI-LITE 26ML ORANGE

## (undated) DEVICE — GLOVE INDICATOR PI UNDERGLOVE SZ 6.5 BLUE